# Patient Record
Sex: FEMALE | Race: WHITE | NOT HISPANIC OR LATINO | Employment: UNEMPLOYED | URBAN - METROPOLITAN AREA
[De-identification: names, ages, dates, MRNs, and addresses within clinical notes are randomized per-mention and may not be internally consistent; named-entity substitution may affect disease eponyms.]

---

## 2024-08-21 ENCOUNTER — LAB (OUTPATIENT)
Dept: LAB | Facility: HOSPITAL | Age: 49
End: 2024-08-21
Payer: MEDICAID

## 2024-08-21 ENCOUNTER — TRANSCRIBE ORDERS (OUTPATIENT)
Dept: LAB | Facility: HOSPITAL | Age: 49
End: 2024-08-21
Payer: MEDICAID

## 2024-08-21 DIAGNOSIS — E78.5 HYPERLIPIDEMIA, UNSPECIFIED HYPERLIPIDEMIA TYPE: ICD-10-CM

## 2024-08-21 DIAGNOSIS — D64.9 ANEMIA, UNSPECIFIED TYPE: Primary | ICD-10-CM

## 2024-08-21 DIAGNOSIS — I10 ESSENTIAL HYPERTENSION, MALIGNANT: ICD-10-CM

## 2024-08-21 DIAGNOSIS — E55.9 AVITAMINOSIS D: ICD-10-CM

## 2024-08-21 DIAGNOSIS — E13.69 OTHER SPECIFIED DIABETES MELLITUS WITH OTHER SPECIFIED COMPLICATION, UNSPECIFIED WHETHER LONG TERM INSULIN USE: ICD-10-CM

## 2024-08-21 DIAGNOSIS — D64.9 ANEMIA, UNSPECIFIED TYPE: ICD-10-CM

## 2024-08-21 LAB
ALBUMIN SERPL-MCNC: 3.9 G/DL (ref 3.5–5.2)
ALBUMIN/GLOB SERPL: 1.3 G/DL
ALP SERPL-CCNC: 104 U/L (ref 39–117)
ALT SERPL W P-5'-P-CCNC: 25 U/L (ref 1–33)
ANION GAP SERPL CALCULATED.3IONS-SCNC: 11 MMOL/L (ref 5–15)
AST SERPL-CCNC: 18 U/L (ref 1–32)
BASOPHILS # BLD AUTO: 0.05 10*3/MM3 (ref 0–0.2)
BASOPHILS NFR BLD AUTO: 0.5 % (ref 0–1.5)
BILIRUB SERPL-MCNC: 0.6 MG/DL (ref 0–1.2)
BUN SERPL-MCNC: 11 MG/DL (ref 6–20)
BUN/CREAT SERPL: 14.5 (ref 7–25)
CALCIUM SPEC-SCNC: 9.7 MG/DL (ref 8.6–10.5)
CHLORIDE SERPL-SCNC: 101 MMOL/L (ref 98–107)
CHOLEST SERPL-MCNC: 175 MG/DL (ref 0–200)
CO2 SERPL-SCNC: 28 MMOL/L (ref 22–29)
CREAT SERPL-MCNC: 0.76 MG/DL (ref 0.57–1)
DEPRECATED RDW RBC AUTO: 46.7 FL (ref 37–54)
EGFRCR SERPLBLD CKD-EPI 2021: 95 ML/MIN/1.73
EOSINOPHIL # BLD AUTO: 0.26 10*3/MM3 (ref 0–0.4)
EOSINOPHIL NFR BLD AUTO: 2.4 % (ref 0.3–6.2)
ERYTHROCYTE [DISTWIDTH] IN BLOOD BY AUTOMATED COUNT: 13.4 % (ref 12.3–15.4)
GLOBULIN UR ELPH-MCNC: 3 GM/DL
GLUCOSE SERPL-MCNC: 139 MG/DL (ref 65–99)
HBA1C MFR BLD: 9.6 % (ref 4.8–5.6)
HCT VFR BLD AUTO: 51.4 % (ref 34–46.6)
HDLC SERPL-MCNC: 44 MG/DL (ref 40–60)
HGB BLD-MCNC: 17 G/DL (ref 12–15.9)
IMM GRANULOCYTES # BLD AUTO: 0.02 10*3/MM3 (ref 0–0.05)
IMM GRANULOCYTES NFR BLD AUTO: 0.2 % (ref 0–0.5)
LDLC SERPL CALC-MCNC: 103 MG/DL (ref 0–100)
LDLC/HDLC SERPL: 2.26 {RATIO}
LYMPHOCYTES # BLD AUTO: 3.71 10*3/MM3 (ref 0.7–3.1)
LYMPHOCYTES NFR BLD AUTO: 33.6 % (ref 19.6–45.3)
MCH RBC QN AUTO: 31.1 PG (ref 26.6–33)
MCHC RBC AUTO-ENTMCNC: 33.1 G/DL (ref 31.5–35.7)
MCV RBC AUTO: 94 FL (ref 79–97)
MONOCYTES # BLD AUTO: 0.66 10*3/MM3 (ref 0.1–0.9)
MONOCYTES NFR BLD AUTO: 6 % (ref 5–12)
NEUTROPHILS NFR BLD AUTO: 57.3 % (ref 42.7–76)
NEUTROPHILS NFR BLD AUTO: 6.35 10*3/MM3 (ref 1.7–7)
NRBC BLD AUTO-RTO: 0 /100 WBC (ref 0–0.2)
PLATELET # BLD AUTO: 308 10*3/MM3 (ref 140–450)
PMV BLD AUTO: 11.1 FL (ref 6–12)
POTASSIUM SERPL-SCNC: 4 MMOL/L (ref 3.5–5.2)
PROT SERPL-MCNC: 6.9 G/DL (ref 6–8.5)
RBC # BLD AUTO: 5.47 10*6/MM3 (ref 3.77–5.28)
SODIUM SERPL-SCNC: 140 MMOL/L (ref 136–145)
TRIGL SERPL-MCNC: 158 MG/DL (ref 0–150)
VLDLC SERPL-MCNC: 28 MG/DL (ref 5–40)
WBC NRBC COR # BLD AUTO: 11.05 10*3/MM3 (ref 3.4–10.8)

## 2024-08-21 PROCEDURE — 82306 VITAMIN D 25 HYDROXY: CPT

## 2024-08-21 PROCEDURE — 36415 COLL VENOUS BLD VENIPUNCTURE: CPT

## 2024-08-21 PROCEDURE — 80053 COMPREHEN METABOLIC PANEL: CPT

## 2024-08-21 PROCEDURE — 85025 COMPLETE CBC W/AUTO DIFF WBC: CPT

## 2024-08-21 PROCEDURE — 80061 LIPID PANEL: CPT

## 2024-08-21 PROCEDURE — 83036 HEMOGLOBIN GLYCOSYLATED A1C: CPT

## 2024-08-22 LAB — 25(OH)D3 SERPL-MCNC: 28.8 NG/ML (ref 30–100)

## 2024-08-27 ENCOUNTER — TELEMEDICINE (OUTPATIENT)
Dept: FAMILY MEDICINE CLINIC | Facility: TELEHEALTH | Age: 49
End: 2024-08-27
Payer: MEDICAID

## 2024-08-27 DIAGNOSIS — Z20.818 EXPOSURE TO STREP THROAT: ICD-10-CM

## 2024-08-27 DIAGNOSIS — J02.9 ACUTE PHARYNGITIS, UNSPECIFIED ETIOLOGY: Primary | ICD-10-CM

## 2024-08-27 RX ORDER — CLOPIDOGREL BISULFATE 75 MG
75 TABLET ORAL DAILY
COMMUNITY
Start: 2022-11-01

## 2024-08-27 RX ORDER — BACLOFEN 10 MG/1
1 TABLET ORAL 3 TIMES DAILY
COMMUNITY
Start: 2024-06-12

## 2024-08-27 RX ORDER — FLUOXETINE HYDROCHLORIDE 40 MG/1
CAPSULE ORAL
COMMUNITY

## 2024-08-27 RX ORDER — INSULIN GLARGINE 100 [IU]/ML
INJECTION, SOLUTION SUBCUTANEOUS
COMMUNITY
Start: 2024-08-22

## 2024-08-27 RX ORDER — GABAPENTIN 100 MG/1
CAPSULE ORAL
COMMUNITY
Start: 2024-06-05

## 2024-08-27 RX ORDER — INSULIN GLULISINE 100 [IU]/ML
INJECTION, SOLUTION SUBCUTANEOUS
COMMUNITY

## 2024-08-27 RX ORDER — AMOXICILLIN 500 MG/1
500 CAPSULE ORAL 2 TIMES DAILY
Qty: 20 CAPSULE | Refills: 0 | Status: SHIPPED | OUTPATIENT
Start: 2024-08-27 | End: 2024-09-06

## 2024-08-27 RX ORDER — IPRATROPIUM BROMIDE 17 UG/1
AEROSOL, METERED RESPIRATORY (INHALATION)
COMMUNITY
Start: 2024-06-06

## 2024-08-27 RX ORDER — FLUCONAZOLE 150 MG/1
150 TABLET ORAL
Qty: 2 TABLET | Refills: 0 | Status: SHIPPED | OUTPATIENT
Start: 2024-08-27

## 2024-08-27 RX ORDER — NITROGLYCERIN 0.4 MG/1
TABLET SUBLINGUAL
COMMUNITY
Start: 2024-06-05

## 2024-08-27 RX ORDER — EMPAGLIFLOZIN 25 MG/1
TABLET, FILM COATED ORAL
COMMUNITY

## 2024-08-27 NOTE — PROGRESS NOTES
HPI  Geraldine Pat is a 49 y.o. female  presents with complaint of two week history of sore throat, headaches, and fatigue. Grandchildren have had strep. Unknown if she has had a fever but describes it as her head and ears feel hot at times. Has had strep in the past.     Review of Systems    No past medical history on file.    No family history on file.    Social History     Socioeconomic History    Marital status: Single         There were no vitals taken for this visit.    PHYSICAL EXAM  Physical Exam   Constitutional: She appears well-developed and well-nourished.   HENT:   Head: Normocephalic.   Nose: Nose normal.   Neck: Neck normal appearance.  Pulmonary/Chest: Effort normal.   Neurological: She is alert.   Psychiatric: She has a normal mood and affect. Her speech is normal.       Diagnoses and all orders for this visit:    1. Acute pharyngitis, unspecified etiology (Primary)  -     amoxicillin (AMOXIL) 500 MG capsule; Take 1 capsule by mouth 2 (Two) Times a Day for 10 days.  Dispense: 20 capsule; Refill: 0  -     fluconazole (Diflucan) 150 MG tablet; Take 1 tablet by mouth Every 72 (Seventy-Two) Hours As Needed (yeast).  Dispense: 2 tablet; Refill: 0    2. Exposure to strep throat  -     amoxicillin (AMOXIL) 500 MG capsule; Take 1 capsule by mouth 2 (Two) Times a Day for 10 days.  Dispense: 20 capsule; Refill: 0  -     fluconazole (Diflucan) 150 MG tablet; Take 1 tablet by mouth Every 72 (Seventy-Two) Hours As Needed (yeast).  Dispense: 2 tablet; Refill: 0          FOLLOW-UP  As discussed during visit with [facility], if symptoms worsen or fail to improve, follow-up with PCP/Urgent Care/Emergency Department.    Patient verbalizes understanding of medications, instructions for treatment and follow-up.    Agatha Heredia, APRN  08/27/2024  19:27 EDT    The use of a video visit has been reviewed with the patient and verbal informed consent has been obtained. Myself and Geraldine Pat participated  in this visit. The patient is located in [place], [place], and I am located in [place], [place]. [employer] and [employer] were utilized.

## 2024-10-10 ENCOUNTER — OFFICE VISIT (OUTPATIENT)
Dept: FAMILY MEDICINE CLINIC | Facility: CLINIC | Age: 49
End: 2024-10-10
Payer: MEDICAID

## 2024-10-10 ENCOUNTER — PATIENT ROUNDING (BHMG ONLY) (OUTPATIENT)
Dept: FAMILY MEDICINE CLINIC | Facility: CLINIC | Age: 49
End: 2024-10-10
Payer: MEDICAID

## 2024-10-10 VITALS
TEMPERATURE: 97.8 F | HEIGHT: 67 IN | DIASTOLIC BLOOD PRESSURE: 74 MMHG | BODY MASS INDEX: 42.19 KG/M2 | SYSTOLIC BLOOD PRESSURE: 116 MMHG | OXYGEN SATURATION: 94 % | RESPIRATION RATE: 16 BRPM | HEART RATE: 78 BPM | WEIGHT: 268.8 LBS

## 2024-10-10 DIAGNOSIS — E11.65 TYPE 2 DIABETES MELLITUS WITH HYPERGLYCEMIA, WITH LONG-TERM CURRENT USE OF INSULIN: Primary | ICD-10-CM

## 2024-10-10 DIAGNOSIS — M54.40 CHRONIC LOW BACK PAIN WITH SCIATICA, SCIATICA LATERALITY UNSPECIFIED, UNSPECIFIED BACK PAIN LATERALITY: ICD-10-CM

## 2024-10-10 DIAGNOSIS — Z23 FLU VACCINE NEED: ICD-10-CM

## 2024-10-10 DIAGNOSIS — Z51.81 ENCOUNTER FOR THERAPEUTIC DRUG MONITORING: ICD-10-CM

## 2024-10-10 DIAGNOSIS — Z79.4 TYPE 2 DIABETES MELLITUS WITH HYPERGLYCEMIA, WITH LONG-TERM CURRENT USE OF INSULIN: Primary | ICD-10-CM

## 2024-10-10 DIAGNOSIS — G89.29 CHRONIC LOW BACK PAIN WITH SCIATICA, SCIATICA LATERALITY UNSPECIFIED, UNSPECIFIED BACK PAIN LATERALITY: ICD-10-CM

## 2024-10-10 RX ORDER — BACLOFEN 10 MG/1
10 TABLET ORAL 3 TIMES DAILY
Qty: 60 TABLET | Refills: 3 | Status: SHIPPED | OUTPATIENT
Start: 2024-10-10

## 2024-10-10 RX ORDER — BLOOD-GLUCOSE SENSOR
EACH MISCELLANEOUS
COMMUNITY
Start: 2024-10-02

## 2024-10-10 RX ORDER — ATORVASTATIN CALCIUM 80 MG/1
80 TABLET, FILM COATED ORAL DAILY
COMMUNITY
Start: 2024-09-18

## 2024-10-10 RX ORDER — GABAPENTIN 300 MG/1
300 CAPSULE ORAL 3 TIMES DAILY
COMMUNITY
End: 2024-10-10 | Stop reason: SDUPTHER

## 2024-10-10 RX ORDER — GABAPENTIN 300 MG/1
300 CAPSULE ORAL EVERY 12 HOURS
Qty: 60 CAPSULE | Refills: 3 | Status: SHIPPED | OUTPATIENT
Start: 2024-10-10

## 2024-10-10 RX ORDER — PEN NEEDLE, DIABETIC 32GX 5/32"
NEEDLE, DISPOSABLE MISCELLANEOUS
COMMUNITY
Start: 2024-09-30

## 2024-10-10 RX ORDER — UBIQUINOL 100 MG
CAPSULE ORAL
COMMUNITY
Start: 2024-09-17

## 2024-10-10 NOTE — PROGRESS NOTES
Subjective   Geraldine Pat is a 49 y.o. female  Establish Care (Recently moved to [facility] from [place]. Req flu vaccine.), Back Pain (Req RF for gabapentin, UDS.), and Diabetes      Back Pain  Pertinent negatives include no chest pain, numbness or weakness.   Diabetes  Pertinent negatives for hypoglycemia include no dizziness. Pertinent negatives for diabetes include no chest pain, no fatigue, no polydipsia, no polyphagia, no polyuria and no weakness.     History of Present Illness  The patient is a 48-year-old female who presents for evaluation of multiple medical concerns.    She has been managing type 2 diabetes for over three decades, with her A1c levels typically ranging between 8 and 14, although the lowest recorded was 7.9. Her current treatment regimen includes Lantus and Apidra insulin, along with 25 mg of Jardiance, reduced from an initial 100 mg dosage. She previously used Ozempic, which she found beneficial, but discontinued it due to uncertainty about its availability in her new location. She does not currently have an endocrinologist.    She suffers from spinal stenosis, which causes chronic back pain. Despite having two damaged discs, she is able to stand without falling on most days. However, she experiences weakness, instability, and significant pain. She has consulted with an orthopedic specialist and underwent two MRIs in November 2023. Her pain is generally manageable, but she experiences flare-ups that render her unable to walk or perform daily activities. Due to her diabetes and weight, surgery is not recommended. She takes gabapentin 300 mg twice daily for pain management, which she finds effective. She attempted to reduce the dosage but found it less effective. She also reports shakiness and difficulty rising from a seated position. She has tried physical therapy and is requesting a refill of her baclofen prescription.    She reports no heart issues and has had all relevant tests. She has  seen a cardiologist in the past. She is still taking Prozac.    SOCIAL HISTORY  She smokes.    FAMILY HISTORY  Hyperinsulinism runs in the family.    The following portions of the patient's history were reviewed and updated as appropriate: allergies, current medications, past social history and problem list    Review of Systems   Constitutional:  Negative for appetite change, diaphoresis, fatigue and unexpected weight change.   Eyes:  Negative for visual disturbance.   Respiratory:  Negative for chest tightness and shortness of breath.    Cardiovascular:  Negative for chest pain, palpitations and leg swelling.   Gastrointestinal:  Negative for diarrhea, nausea and vomiting.   Endocrine: Negative for polydipsia, polyphagia and polyuria.   Musculoskeletal:  Positive for back pain.   Skin:  Negative for color change.   Neurological:  Negative for dizziness, weakness, light-headedness and numbness.       Objective     Vitals:    10/10/24 1110   BP: 116/74   Pulse: 78   Resp: 16   Temp: 97.8 °F (36.6 °C)   SpO2: 94%       Physical Exam  Vitals and nursing note reviewed.   Constitutional:       General: She is not in acute distress.     Appearance: Normal appearance. She is well-developed. She is not ill-appearing, toxic-appearing or diaphoretic.   Neck:      Thyroid: No thyromegaly.      Vascular: No JVD.   Cardiovascular:      Rate and Rhythm: Normal rate and regular rhythm.      Pulses: Normal pulses.      Heart sounds: Normal heart sounds. No murmur heard.  Pulmonary:      Effort: Pulmonary effort is normal.      Breath sounds: Normal breath sounds.   Abdominal:      Palpations: Abdomen is soft. There is no mass.      Tenderness: There is no abdominal tenderness.   Musculoskeletal:      Cervical back: Neck supple.   Lymphadenopathy:      Cervical: No cervical adenopathy.   Skin:     General: Skin is warm and dry.   Neurological:      Mental Status: She is alert.      Sensory: No sensory deficit.       Physical  Exam      Assessment & Plan   Assessment & Plan  1. Type 2 Diabetes Mellitus.  He has been managing his diabetes with Lantus, Apidra, and Jardiance 25 mg. He was previously on Ozempic, which helped control his blood sugar, but it was discontinued due to concerns about availability. A referral to endocrinology will be made within the next one to two months to explore further management options, including the possibility of resuming Ozempic or similar medications. Blood work has been ordered, and he will provide a urine sample today to check for microalbumin.    2. Spinal Stenosis.  He experiences chronic back pain due to spinal stenosis and has a history of severe spinal stenosis with a couple of damaged disks. His pain is currently managed with gabapentin 300 mg twice a day, which has been refilled. Baclofen has also been refilled. He has tried physical therapy and is not currently considering surgical options due to his diabetes and weight. The need for a consultation with a pain interventionalist will be considered if his pain becomes unmanageable.    3. Medication Management.  Refills for gabapentin 300 mg twice a day and baclofen have been provided. He continues to take Prozac, Plavix, Lantus, Jardiance 25 mg, and Lipitor 80 mg.      Diagnoses and all orders for this visit:    1. Type 2 diabetes mellitus with hyperglycemia, with long-term current use of insulin (Primary)  -     Comprehensive Metabolic Panel; Future  -     Hemoglobin A1c; Future  -     Lipid Panel; Future    2. Chronic low back pain with sciatica, sciatica laterality unspecified, unspecified back pain laterality  -     gabapentin (NEURONTIN) 300 MG capsule; Take 1 capsule by mouth Every 12 (Twelve) Hours.  Dispense: 60 capsule; Refill: 3  -     baclofen (LIORESAL) 10 MG tablet; Take 1 tablet by mouth 3 times a day.  Dispense: 60 tablet; Refill: 3    3. Encounter for therapeutic drug monitoring  -     Compliance Drug Analysis, Ur - Urine, Clean  Catch; Future    4. Flu vaccine need  -     Fluzone High-Dose 65+yrs (4882-6835)    As part of this patient's treatment plan, patient will be prescribed controlled substances. The patient has been made aware of appropriate use of such medications, including potential risk of somnolence, limited ability to drive and /or work safely, and potential for dependence or overdose. It has also been made clear that these medications are for use by this patient only, without concomitant use of alcohol or other substances unless prescribed.Controlled substance status of medication discussed with patient, discussed risks of medication including abuse potential and diversion potential and need to follow up for reevaluation appointment in order to receive further refills.    Part of this note may be an electronic transcription/translation of spoken language to printed text using the Dragon Dictation System.        I spent 15 minutes in patient care: Reviewing records prior to the visit, examining the patient, entering orders and documentation    Part of this note may be an electronic transcription/translation of spoken language to printed text using the Dragon Dictation System.       Patient or patient representative verbalized consent for the use of Ambient Listening during the visit with  KARRI Patrick for chart documentation. 10/10/2024  11:47 EDT

## 2024-10-10 NOTE — PROGRESS NOTES
[facility] a My-Chart message has been sent to the patient for PATIENT ROUNDING with a My chart message

## 2024-10-11 ENCOUNTER — PREP FOR SURGERY (OUTPATIENT)
Dept: OTHER | Facility: HOSPITAL | Age: 49
End: 2024-10-11
Payer: MEDICAID

## 2024-10-11 DIAGNOSIS — H25.11 NUCLEAR SCLEROTIC CATARACT OF RIGHT EYE: Primary | ICD-10-CM

## 2024-10-11 RX ORDER — TETRACAINE HYDROCHLORIDE 5 MG/ML
1 SOLUTION OPHTHALMIC SEE ADMIN INSTRUCTIONS
OUTPATIENT
Start: 2024-10-11

## 2024-10-11 RX ORDER — SODIUM CHLORIDE 0.9 % (FLUSH) 0.9 %
10 SYRINGE (ML) INJECTION EVERY 12 HOURS SCHEDULED
OUTPATIENT
Start: 2024-10-11

## 2024-10-11 RX ORDER — PREDNISOLONE ACETATE 10 MG/ML
1 SUSPENSION/ DROPS OPHTHALMIC SEE ADMIN INSTRUCTIONS
OUTPATIENT
Start: 2024-10-11

## 2024-10-11 RX ORDER — CYCLOPENT/TROPIC/PHEN/KETR/WAT 1%-1%-2.5%
1 DROPS (EA) OPHTHALMIC (EYE)
OUTPATIENT
Start: 2024-10-11 | End: 2024-10-11

## 2024-10-11 RX ORDER — SODIUM CHLORIDE 0.9 % (FLUSH) 0.9 %
1-10 SYRINGE (ML) INJECTION AS NEEDED
OUTPATIENT
Start: 2024-10-11

## 2024-10-21 LAB — DRUGS UR: NORMAL

## 2024-10-22 NOTE — PRE-PROCEDURE INSTRUCTIONS
PAT phone history completed with patient for upcoming procedure on 10/24/24 with Dr. Moran.    PAT PASS reviewed with patient and they verbalize understanding of the following:     Do not eat or drink anything after midnight the night before procedure unless otherwise instructed by physician/surgeon's office, this includes no gum, candy, mints, tobacco products or e-cigarettes.  Do not shave the area to be operated on at least 48 hours prior to procedure.  Do not wear makeup, lotion, hair products, or nail polish.  Do not wear any jewelry and remove all piercings.  Do not wear any adhesive if you wear dentures.  Do not wear contacts; bring in glasses if needed.  Only take medications on the morning of procedure as instructed by PAT nurse per anesthesia guidelines or as instructed by physician's office.  If you are on any blood thinners reach out to the physician/surgeon's office for instructions on when/if they will need to be stopped prior to procedure.  Bring in picture ID and insurance card, advanced directive copies if applicable, CPAP/BIPAP/Inhalers if indicated morning of procedure, leave any other valuables at home.  Ensure you have arranged for someone to drive you home the day of your procedure and someone to care for you at home afterwards. It is recommended that you do not drive, drink alcohol, or make any major legal decisions for at least 24 hours after your procedure is complete.    Instructions given on hospital entrance and registration location.

## 2024-10-24 ENCOUNTER — ANESTHESIA (OUTPATIENT)
Dept: PERIOP | Facility: HOSPITAL | Age: 49
End: 2024-10-24
Payer: MEDICAID

## 2024-10-24 ENCOUNTER — HOSPITAL ENCOUNTER (OUTPATIENT)
Facility: HOSPITAL | Age: 49
Setting detail: HOSPITAL OUTPATIENT SURGERY
Discharge: HOME | End: 2024-10-24
Attending: OPHTHALMOLOGY | Admitting: OPHTHALMOLOGY
Payer: MEDICAID

## 2024-10-24 ENCOUNTER — ANESTHESIA EVENT (OUTPATIENT)
Dept: PERIOP | Facility: HOSPITAL | Age: 49
End: 2024-10-24
Payer: MEDICAID

## 2024-10-24 VITALS
RESPIRATION RATE: 16 BRPM | TEMPERATURE: 98 F | OXYGEN SATURATION: 94 % | SYSTOLIC BLOOD PRESSURE: 96 MMHG | DIASTOLIC BLOOD PRESSURE: 61 MMHG | HEART RATE: 85 BPM

## 2024-10-24 DIAGNOSIS — H25.11 NUCLEAR SCLEROTIC CATARACT OF RIGHT EYE: ICD-10-CM

## 2024-10-24 LAB — GLUCOSE BLDC GLUCOMTR-MCNC: 134 MG/DL (ref 70–130)

## 2024-10-24 PROCEDURE — 25010000002 LIDOCAINE HCL (PF) 4 % SOLUTION: Performed by: OPHTHALMOLOGY

## 2024-10-24 PROCEDURE — 25010000002 MIDAZOLAM PER 1MG: Performed by: NURSE ANESTHETIST, CERTIFIED REGISTERED

## 2024-10-24 PROCEDURE — 25010000002 FENTANYL CITRATE (PF) 50 MCG/ML SOLUTION: Performed by: NURSE ANESTHETIST, CERTIFIED REGISTERED

## 2024-10-24 PROCEDURE — V2632 POST CHMBR INTRAOCULAR LENS: HCPCS | Performed by: OPHTHALMOLOGY

## 2024-10-24 PROCEDURE — 82948 REAGENT STRIP/BLOOD GLUCOSE: CPT | Performed by: OPHTHALMOLOGY

## 2024-10-24 DEVICE — IMPLANTABLE DEVICE: Type: IMPLANTABLE DEVICE | Site: POSTERIOR CHAMBER | Status: FUNCTIONAL

## 2024-10-24 RX ORDER — PREDNISOLONE ACETATE 10 MG/ML
1 SUSPENSION/ DROPS OPHTHALMIC 4 TIMES DAILY
Start: 2024-10-24

## 2024-10-24 RX ORDER — LIDOCAINE HYDROCHLORIDE 40 MG/ML
INJECTION, SOLUTION RETROBULBAR AS NEEDED
Status: DISCONTINUED | OUTPATIENT
Start: 2024-10-24 | End: 2024-10-24 | Stop reason: HOSPADM

## 2024-10-24 RX ORDER — KETAMINE HCL IN NACL, ISO-OSM 100MG/10ML
SYRINGE (ML) INJECTION AS NEEDED
Status: DISCONTINUED | OUTPATIENT
Start: 2024-10-24 | End: 2024-10-24 | Stop reason: SURG

## 2024-10-24 RX ORDER — BALANCED SALT SOLUTION 6.4; .75; .48; .3; 3.9; 1.7 MG/ML; MG/ML; MG/ML; MG/ML; MG/ML; MG/ML
SOLUTION OPHTHALMIC AS NEEDED
Status: DISCONTINUED | OUTPATIENT
Start: 2024-10-24 | End: 2024-10-24 | Stop reason: HOSPADM

## 2024-10-24 RX ORDER — POVIDONE-IODINE 5 %
SOLUTION, NON-ORAL OPHTHALMIC (EYE) AS NEEDED
Status: DISCONTINUED | OUTPATIENT
Start: 2024-10-24 | End: 2024-10-24 | Stop reason: HOSPADM

## 2024-10-24 RX ORDER — NEOMYCIN SULFATE, POLYMYXIN B SULFATE, AND DEXAMETHASONE 3.5; 10000; 1 MG/G; [USP'U]/G; MG/G
OINTMENT OPHTHALMIC AS NEEDED
Status: DISCONTINUED | OUTPATIENT
Start: 2024-10-24 | End: 2024-10-24 | Stop reason: HOSPADM

## 2024-10-24 RX ORDER — TETRACAINE HYDROCHLORIDE 5 MG/ML
SOLUTION OPHTHALMIC AS NEEDED
Status: DISCONTINUED | OUTPATIENT
Start: 2024-10-24 | End: 2024-10-24 | Stop reason: HOSPADM

## 2024-10-24 RX ORDER — TETRACAINE HYDROCHLORIDE 5 MG/ML
1 SOLUTION OPHTHALMIC SEE ADMIN INSTRUCTIONS
Status: COMPLETED | OUTPATIENT
Start: 2024-10-24 | End: 2024-10-24

## 2024-10-24 RX ORDER — SODIUM CHLORIDE 0.9 % (FLUSH) 0.9 %
1-10 SYRINGE (ML) INJECTION AS NEEDED
Status: DISCONTINUED | OUTPATIENT
Start: 2024-10-24 | End: 2024-10-24 | Stop reason: HOSPADM

## 2024-10-24 RX ORDER — CYCLOPENT/TROPIC/PHEN/KETR/WAT 1%-1%-2.5%
1 DROPS (EA) OPHTHALMIC (EYE)
Status: COMPLETED | OUTPATIENT
Start: 2024-10-24 | End: 2024-10-24

## 2024-10-24 RX ORDER — FENTANYL CITRATE 50 UG/ML
INJECTION, SOLUTION INTRAMUSCULAR; INTRAVENOUS AS NEEDED
Status: DISCONTINUED | OUTPATIENT
Start: 2024-10-24 | End: 2024-10-24 | Stop reason: SURG

## 2024-10-24 RX ORDER — MIDAZOLAM HYDROCHLORIDE 2 MG/2ML
INJECTION, SOLUTION INTRAMUSCULAR; INTRAVENOUS AS NEEDED
Status: DISCONTINUED | OUTPATIENT
Start: 2024-10-24 | End: 2024-10-24 | Stop reason: SURG

## 2024-10-24 RX ORDER — PREDNISOLONE ACETATE 10 MG/ML
1 SUSPENSION/ DROPS OPHTHALMIC SEE ADMIN INSTRUCTIONS
Status: DISCONTINUED | OUTPATIENT
Start: 2024-10-24 | End: 2024-10-24 | Stop reason: HOSPADM

## 2024-10-24 RX ORDER — ACETAZOLAMIDE 500 MG/1
500 CAPSULE, EXTENDED RELEASE ORAL ONCE
Status: COMPLETED | OUTPATIENT
Start: 2024-10-24 | End: 2024-10-24

## 2024-10-24 RX ORDER — PREDNISOLONE ACETATE 10 MG/ML
SUSPENSION/ DROPS OPHTHALMIC AS NEEDED
Status: DISCONTINUED | OUTPATIENT
Start: 2024-10-24 | End: 2024-10-24 | Stop reason: HOSPADM

## 2024-10-24 RX ORDER — SODIUM CHLORIDE 0.9 % (FLUSH) 0.9 %
10 SYRINGE (ML) INJECTION EVERY 12 HOURS SCHEDULED
Status: DISCONTINUED | OUTPATIENT
Start: 2024-10-24 | End: 2024-10-24 | Stop reason: HOSPADM

## 2024-10-24 RX ADMIN — ACETAZOLAMIDE 500 MG: 500 CAPSULE ORAL at 09:49

## 2024-10-24 RX ADMIN — FENTANYL CITRATE 50 MCG: 50 INJECTION, SOLUTION INTRAMUSCULAR; INTRAVENOUS at 09:30

## 2024-10-24 RX ADMIN — Medication 20 MG: at 09:33

## 2024-10-24 RX ADMIN — TETRACAINE HYDROCHLORIDE 1 DROP: 5 SOLUTION OPHTHALMIC at 08:01

## 2024-10-24 RX ADMIN — TETRACAINE HYDROCHLORIDE 1 DROP: 5 SOLUTION OPHTHALMIC at 08:02

## 2024-10-24 RX ADMIN — Medication 1 DROP: at 08:08

## 2024-10-24 RX ADMIN — Medication 1 DROP: at 08:13

## 2024-10-24 RX ADMIN — Medication 1 DROP: at 08:03

## 2024-10-24 RX ADMIN — MIDAZOLAM HYDROCHLORIDE 2 MG: 1 INJECTION, SOLUTION INTRAMUSCULAR; INTRAVENOUS at 09:30

## 2024-10-24 NOTE — ANESTHESIA PREPROCEDURE EVALUATION
Anesthesia Evaluation     Patient summary reviewed and Nursing notes reviewed   NPO Solid Status: > 8 hours  NPO Liquid Status: > 8 hours           Airway   Mallampati: II  TM distance: >3 FB  Neck ROM: full  Possible difficult intubation  Dental      Pulmonary    (+) COPD, asthma,  Cardiovascular     (+) PVD, DVT, hyperlipidemia      Neuro/Psych  (+) psychiatric history  GI/Hepatic/Renal/Endo    (+) obesity, morbid obesity, diabetes mellitus    Musculoskeletal     Abdominal    Substance History      OB/GYN          Other   arthritis,                 Anesthesia Plan    ASA 3     MAC     intravenous induction     Anesthetic plan, risks, benefits, and alternatives have been provided, discussed and informed consent has been obtained with: patient.  Pre-procedure education provided  Plan discussed with CRNA.    CODE STATUS:

## 2024-10-24 NOTE — ANESTHESIA POSTPROCEDURE EVALUATION
Patient: Geraldine Pat    Procedure Summary       Date: 10/24/24 Room / Location: [facility] OR 3 /  [facility]    Anesthesia Start: 0928 Anesthesia Stop: 0938    Procedure: CATARACT PHACO EXTRACTION WITH INTRAOCULAR LENS IMPLANT RIGHT (Right: Eye) Diagnosis:       Nuclear sclerotic cataract of right eye      (Nuclear sclerotic cataract of right eye [H25.11])    Surgeons: Suresh Mroan MD Provider: Kunal Frost CRNA    Anesthesia Type: MAC ASA Status: 3            Anesthesia Type: MAC    Vitals  No vitals data found for the desired time range.          Post Anesthesia Care and Evaluation    Patient location during evaluation: bedside  Patient participation: complete - patient participated  Level of consciousness: awake  Pain score: 0  Pain management: adequate    Airway patency: patent  Anesthetic complications: No anesthetic complications  PONV Status: controlled  Cardiovascular status: acceptable and stable  Respiratory status: acceptable and room air  Hydration status: acceptable    Comments: See nursing documentation for post op vital signs

## 2024-10-24 NOTE — DISCHARGE INSTRUCTIONS
Formerly [facility]  [address] [place] [address]  (P): [phone]           (F): [phone]    Geraldine Pat  PATIENT NAME:__________________________________    Right Eye    POST OPERATIVE INSTRUCTIONS    You have received anesthesia today. DO NOT drive, drink alcohol, sign legal documents.   After surgery, your eye will not hurt. It may feel scratchy, sticky or uncomfortable. Your eye will be sensitive to light.  Most people see better 1-3 days after the procedure, but it could take 3 weeks to get the full benefits and reach your visual potential. If your vision is blurry for a few days it is normal, and means you may have swelling outside or inside the eye. For some patients, a bubble is placed and there will be blurriness.   You should receive a post-op kit with tape and an eye shield. Wear the shield for the first 3 nights after surgery to keep you from rubbing the eye.  Most people are able to return to their normal routine 1-3 days after surgery, however, due to the brain adjusting to your new vision you may have trouble judging distances and want to be careful when driving and going up and downstairs.   You can shower and wash your hair the day after surgery. Keep water, shampoo, hair spray and shaving lotion out of the eye, especially for the first week.  During the first week, you should AVOID:   Rubbing or putting pressure on your eye.  Eye make-up, face cream or lotion, hair coloring or perms  Strenuous activities, such as running or lifting weights, as to avoid sweat from getting in the eye. Avoid swimming, hot tubs, fumes or michel conditions.   Keep your head above your heart (no hanging the head down for periods of time).  Some discomfort and blurred vision after surgery are normal, but if you have any unusual pain, swelling, bleeding or sudden decrease in vision, contact our office immediately. Emergency assistance is available at any time by calling:    Dr. Suresh Loo  SAMANTHA Moran    [phone]    If unable to reach call [facility] @ [phone]      POST OPERATIVE DROP INSTRUCTIONS  You have been prescribed eye drops to use after surgery, please follow these instructions:  PLACE A PARDEEP IN THE DAY COLUMN EACH TIME YOU USE TO KEEP ON SCHEDULE. WAIT 5 MINUTES IN BETWEEN DROPS    Prednisolone (PINK TOP) SHAKE WELL BEFORE USE   GIVEN TO YOU BY THE HOSPITAL    WEEK 1-USE 4  (FOUR) TIMES A DAY DAY 1   DAY 2 DAY 3 DAY 4 DAY 5 DAY 6 DAY 7     WEEK 2-USE 3 (THREE)  TIMES A DAY DAY 1 DAY 2 DAY 3 DAY 4 DAY 5 DAY 6 DAY 7     WEEK 3-USE 2 (TWO) TIMES A DAY DAY 1 DAY 2 DAY 3 DAY 4 DAY 5 DAY 6 DAY 7     WEEK 4-USE 1 (ONE)TIME A DAY DAY 1 DAY 2 DAY 3 DAY 4 DAY 5 DAY 6 DAY 7         Ketorolac (GRAY TOP) PRESCRIBED TO YOUR PHARMACY    WEEK 1-USE 4  (FOUR) TIMES A DAY DAY 1   DAY 2 DAY 3 DAY 4 DAY 5 DAY 6 DAY 7     WEEK 2-USE 3 (THREE)  TIMES A DAY DAY 1 DAY 2 DAY 3 DAY 4 DAY 5 DAY 6 DAY 7     WEEK 3-USE 2 (TWO) TIMES A DAY DAY 1 DAY 2 DAY 3 DAY 4 DAY 5 DAY 6 DAY 7     WEEK 4-USE 1 (ONE)TIME A DAY DAY 1 DAY 2 DAY 3 DAY 4 DAY 5 DAY 6 DAY 7       Moxifloxacin (TAN TOP) PRESCRIBED TO YOUR PHARMACY    WEEK 1-USE 4  (FOUR) TIMES A DAY DAY 1   DAY 2 DAY 3 DAY 4 DAY 5 DAY 6 DAY 7     No pushing, pulling, tugging,  heavy lifting, or strenuous activity.  No major decision making, driving, or drinking alcoholic beverages for 24 hours. ( due to the medications you have  received)  Always use good hand hygiene/washing techniques.  NO driving while taking pain medications.    * if you have an incision:  Check your incision area every day for signs of infection.   Check for:  * more redness, swelling, or pain  *more fluid or blood  *warmth  *pus or bad smellTo assist you in voiding:  Drink plenty of fluids  Listen to running water while attempting to void.    If you are unable to urinate and you have an uncomfortable urge to void or it has been   6  hours since you were discharged, return to the Emergency Room

## 2024-10-24 NOTE — OP NOTE
OPERATIVE NOTE    Date of Procedure: 10/24/2024  Patient Name: Geraldine Pat  Patient MRN: [id number]  YOB: 1975     Preoperative Diagnosis: Right nuclear sclerotic cataract.     Postoperative Diagnosis: Right nuclear sclerotic cataract.     Procedure Performed: Phacoemulsification with implantation of a foldable posterior chamber intraocular lens, Right eye.     Surgeon: Suresh Moran MD    Anesthesia:  Monitored Anesthesia Care (MAC)      Brief History and Indication: The patient presents with a history of past progressive loss of vision.  Patient was diagnosed with cataract and requests removal for increased ability to read and see.     Operation Description: The patient was taken to the OR and prepped and draped in the usual sterile ophthalmic fashion. A lid speculum was placed in the eye.  A #75 blade was then used to make a stab incision two o’clock hours from the intended temporal clear cornea groove. The anterior chamber was then inflated with a Viscoelastic. A metal microkeratome blade was then used to enter the anterior chamber at the temporal clear cornea site. A three level tunnel incision was made. A curvilinear capsulorrhexis was then performed with a bent cystotome needle and capsulorrhexis forceps.  BSS on a 30 gauge bent cannula was used to hydro-dissect, and hydro-delineate the lens. Good fluid waves and hydro-delineation were noted. Phacoemulsification was then used to remove nuclear material without complications. The residual cortical and lenticular material was then removed with irrigation and aspiration. Viscoelastics were then used to inflate the bag in a soft shell technique. A PCIOL was injected into the bag. Post-implantation, there were no rents or tears in the bag and the lens was noted to be stable and centered. The residual Viscoelastic was then removed with irrigation and aspiration.  The wound was checked and found to be without leaks. Therefore a Polydex ointment and  one drop of Durezol eye drop was placed in the eye.     Implant Information:   Implant Name Type Inv. Item Serial No.  Lot No. LRB No. Used Action   LENS MONOFOCAL IQ [id number] - [id number] - [id number] Implant LENS MONOFOCAL IQ [id number]  Right 1 Implanted       Complications: None    Estimated Blood Loss:  Less than 1 cc.       []   Changed to complex procedure due to: []  hypermature, white cataract. Blue dye was used. []   small iris. A Malyugin Ring was used.    Discharge and Condition  The patient was transported to same day surgery in excellent condition and scheduled for follow-up tomorrow morning. The patient was given instructions on use of eye drops for the operative eye and was specifically instructed to call Dr. Moran at his office or home for any nausea, vomiting, headache, decreased visual acuity, or pain, or if the patient had any general concerns.    Suresh Moran MD  10/24/2024

## 2024-10-24 NOTE — H&P
[facility]         History and Physical    Patient Name: Geraldine Pat  MRN: [id number]  : 1975  Gender: female     HPI: Patient complaint of PPLOV Right eye diagnosed with cataract. Patient requests PHACO PCIOL for Increase of VA/ADL.    History:    Past Medical History:   Diagnosis Date    Arthritis     Asthma     Cataract     Colon polyp     COPD (chronic obstructive pulmonary disease)     Deep vein thrombosis     Depression     Diabetes mellitus     Hyperlipidemia     Low back pain     Obesity     PVD (peripheral vascular disease)     Substance abuse     Visual impairment        Past Surgical History:   Procedure Laterality Date    ABSCESS DRAINAGE      ANGIOPLASTY / STENTING FEMORAL Right     CARDIAC CATHETERIZATION  2024    no stents needed     SECTION      COLONOSCOPY      SKIN GRAFT      TONSILLECTOMY         Social History     Socioeconomic History    Marital status:    Tobacco Use    Smoking status: Every Day     Current packs/day: 1.00     Average packs/day: 1 pack/day for 40.0 years (40.0 ttl pk-yrs)     Types: Cigarettes    Smokeless tobacco: Never   Vaping Use    Vaping status: Never Used   Substance and Sexual Activity    Alcohol use: Not Currently     Comment: Quit 16    Drug use: Not Currently     Types: Amphetamines, Methamphetamines    Sexual activity: Not Currently     Partners: Female, Male     Comment: Non att       Family History   Problem Relation Age of Onset    Alcohol abuse Mother     Depression Mother     Diabetes Mother     Drug abuse Mother     Hearing loss Mother     Hyperlipidemia Mother     Learning disabilities Mother     Mental illness Mother     Developmental Disability Father     Diabetes Father     Drug abuse Father     Heart disease Father     Hyperlipidemia Father     Mental illness Father     Obesity Father     Diabetes Sister     Cancer Sister     Drug abuse Sister        Prior to Admission  Medications:  Medications Prior to Admission   Medication Sig Dispense Refill Last Dose/Taking    ASPIRIN 81 PO Daily.   10/24/2024 at  6:00 AM    atorvastatin (LIPITOR) 80 MG tablet Take 1 tablet by mouth Daily.   10/23/2024 Evening    Atrovent HFA 17 MCG/ACT inhaler As Needed.   10/23/2024 Morning    baclofen (LIORESAL) 10 MG tablet Take 1 tablet by mouth 3 times a day. 60 tablet 3 10/23/2024 Evening    BD Pen Needle Desiree 2nd Gen 32G X 4 MM misc    Past Month    Continuous Glucose Sensor (FreeStyle Hunter 3 Sensor) misc    Past Month    gabapentin (NEURONTIN) 300 MG capsule Take 1 capsule by mouth Every 12 (Twelve) Hours. 60 capsule 3 10/23/2024 Evening    insulin glulisine (Apidra) 100 UNIT/ML injection Inject  under the skin into the appropriate area as directed 3 (Three) Times a Day Before Meals. Sliding scale up to 30 units   10/23/2024 Morning    Jardiance 25 MG tablet tablet Take 1 tablet by mouth Daily.   10/23/2024 Morning    Lantus SoloStar 100 UNIT/ML injection pen Inject 50 Units under the skin into the appropriate area as directed 2 (Two) Times a Day.   10/23/2024 Morning    Plavix 75 MG tablet Take 1 tablet by mouth Daily.   10/24/2024 at  6:00 AM    PROzac 40 MG capsule Take 1 capsule by mouth Daily.   10/23/2024 Morning    Alcohol Swabs (Alcohol Prep) 70 % pads    Unknown    nitroglycerin (NITROSTAT) 0.4 MG SL tablet take 1 tablet by mouth every 5 minutes if needed chest pain (MAX ...  (REFER TO PRESCRIPTION NOTES). (Patient not taking: Reported on 10/22/2024)   Unknown       Allergies:  Allergies   Allergen Reactions    Naproxen Rash     Can take motrin    Quetiapine Delirium and Confusion    Salsalate Rash    Vancomycin Rash        Vitals: Temp:  [97.2 °F (36.2 °C)] 97.2 °F (36.2 °C)  Heart Rate:  [77] 77  Resp:  [18] 18  BP: (183)/(81) 183/81    Review of Systems:   Within Normal Limits Abnormal   HEENT [x]    []     Cardiovascular [x]   []     Gastrointestinal [x]   []     Genitourinary [x]   []      Neurologic [x]   []     Pulmonary [x]   []       Physical Exam:   Within Normal Limits Abnormal   HEENT [x]    []     Heart [x]   []     Lungs [x]   []     Abdomen [x]   []     Extremities [x]   []       Impression: Right nuclear sclerotic cataract.     Plan: CATARACT PHACO EXTRACTION WITH INTRAOCULAR LENS IMPLANT RIGHT (Right)     Suresh Moran MD  10/24/2024

## 2024-12-05 ENCOUNTER — PREP FOR SURGERY (OUTPATIENT)
Dept: OTHER | Facility: HOSPITAL | Age: 49
End: 2024-12-05
Payer: MEDICAID

## 2024-12-05 DIAGNOSIS — H25.12 NUCLEAR SCLEROTIC CATARACT OF LEFT EYE: Primary | ICD-10-CM

## 2024-12-05 RX ORDER — PREDNISOLONE ACETATE 10 MG/ML
1 SUSPENSION/ DROPS OPHTHALMIC SEE ADMIN INSTRUCTIONS
Status: CANCELLED | OUTPATIENT
Start: 2024-12-05

## 2024-12-05 RX ORDER — SODIUM CHLORIDE 0.9 % (FLUSH) 0.9 %
1-10 SYRINGE (ML) INJECTION AS NEEDED
Status: CANCELLED | OUTPATIENT
Start: 2024-12-05

## 2024-12-05 RX ORDER — SODIUM CHLORIDE 0.9 % (FLUSH) 0.9 %
10 SYRINGE (ML) INJECTION EVERY 12 HOURS SCHEDULED
Status: CANCELLED | OUTPATIENT
Start: 2024-12-05

## 2024-12-05 RX ORDER — CYCLOPENT/TROPIC/PHEN/KETR/WAT 1%-1%-2.5%
1 DROPS (EA) OPHTHALMIC (EYE)
Status: CANCELLED | OUTPATIENT
Start: 2024-12-05 | End: 2024-12-05

## 2024-12-05 RX ORDER — TETRACAINE HYDROCHLORIDE 5 MG/ML
1 SOLUTION OPHTHALMIC SEE ADMIN INSTRUCTIONS
Status: CANCELLED | OUTPATIENT
Start: 2024-12-05

## 2024-12-13 NOTE — PRE-PROCEDURE INSTRUCTIONS
PAT phone history completed with patient for upcoming procedure on 12/16/24 with Dr. Moran.    PAT PASS reviewed with patient and they verbalize understanding of the following:     Do not eat or drink anything after midnight the night before procedure unless otherwise instructed by physician/surgeon's office, this includes no gum, candy, mints, tobacco products or e-cigarettes.  Do not shave the area to be operated on at least 48 hours prior to procedure.  Do not wear makeup, lotion, hair products, or nail polish.  Do not wear any jewelry and remove all piercings.  Do not wear any adhesive if you wear dentures.  Do not wear contacts; bring in glasses if needed.  Only take medications on the morning of procedure as instructed by PAT nurse per anesthesia guidelines or as instructed by physician's office.  If you are on any blood thinners reach out to the physician/surgeon's office for instructions on when/if they will need to be stopped prior to procedure.  Bring in picture ID and insurance card, advanced directive copies if applicable, CPAP/BIPAP/Inhalers if indicated morning of procedure, leave any other valuables at home.  Ensure you have arranged for someone to drive you home the day of your procedure and someone to care for you at home afterwards. It is recommended that you do not drive, drink alcohol, or make any major legal decisions for at least 24 hours after your procedure is complete.  ERAS instructions given unless otherwise instructed per surgeon's orders.    Instructions given on hospital entrance and registration location.

## 2024-12-16 ENCOUNTER — ANESTHESIA EVENT (OUTPATIENT)
Dept: PERIOP | Facility: HOSPITAL | Age: 49
End: 2024-12-16
Payer: MEDICAID

## 2024-12-16 ENCOUNTER — ANESTHESIA (OUTPATIENT)
Dept: PERIOP | Facility: HOSPITAL | Age: 49
End: 2024-12-16
Payer: MEDICAID

## 2024-12-16 ENCOUNTER — HOSPITAL ENCOUNTER (OUTPATIENT)
Facility: HOSPITAL | Age: 49
Setting detail: HOSPITAL OUTPATIENT SURGERY
Discharge: HOME | End: 2024-12-16
Attending: OPHTHALMOLOGY | Admitting: OPHTHALMOLOGY
Payer: MEDICAID

## 2024-12-16 VITALS
HEIGHT: 67 IN | TEMPERATURE: 97.3 F | WEIGHT: 260 LBS | OXYGEN SATURATION: 97 % | BODY MASS INDEX: 40.81 KG/M2 | DIASTOLIC BLOOD PRESSURE: 70 MMHG | HEART RATE: 73 BPM | SYSTOLIC BLOOD PRESSURE: 124 MMHG | RESPIRATION RATE: 16 BRPM

## 2024-12-16 DIAGNOSIS — H25.12 NUCLEAR SCLEROTIC CATARACT OF LEFT EYE: ICD-10-CM

## 2024-12-16 LAB — GLUCOSE BLDC GLUCOMTR-MCNC: 142 MG/DL (ref 70–130)

## 2024-12-16 PROCEDURE — 25010000002 MIDAZOLAM PER 1MG: Performed by: NURSE ANESTHETIST, CERTIFIED REGISTERED

## 2024-12-16 PROCEDURE — 82948 REAGENT STRIP/BLOOD GLUCOSE: CPT | Performed by: OPHTHALMOLOGY

## 2024-12-16 PROCEDURE — V2632 POST CHMBR INTRAOCULAR LENS: HCPCS | Performed by: OPHTHALMOLOGY

## 2024-12-16 PROCEDURE — 25010000002 FENTANYL CITRATE (PF) 50 MCG/ML SOLUTION PREFILLED SYRINGE: Performed by: NURSE ANESTHETIST, CERTIFIED REGISTERED

## 2024-12-16 PROCEDURE — 25010000002 LIDOCAINE HCL (PF) 4 % SOLUTION: Performed by: OPHTHALMOLOGY

## 2024-12-16 DEVICE — IMPLANTABLE DEVICE: Type: IMPLANTABLE DEVICE | Site: POSTERIOR CHAMBER | Status: FUNCTIONAL

## 2024-12-16 RX ORDER — SODIUM CHLORIDE 0.9 % (FLUSH) 0.9 %
10 SYRINGE (ML) INJECTION EVERY 12 HOURS SCHEDULED
Status: DISCONTINUED | OUTPATIENT
Start: 2024-12-16 | End: 2024-12-16 | Stop reason: HOSPADM

## 2024-12-16 RX ORDER — MIDAZOLAM HYDROCHLORIDE 2 MG/2ML
INJECTION, SOLUTION INTRAMUSCULAR; INTRAVENOUS AS NEEDED
Status: DISCONTINUED | OUTPATIENT
Start: 2024-12-16 | End: 2024-12-16 | Stop reason: SURG

## 2024-12-16 RX ORDER — PREDNISOLONE ACETATE 10 MG/ML
SUSPENSION/ DROPS OPHTHALMIC AS NEEDED
Status: DISCONTINUED | OUTPATIENT
Start: 2024-12-16 | End: 2024-12-16 | Stop reason: HOSPADM

## 2024-12-16 RX ORDER — BALANCED SALT SOLUTION 6.4; .75; .48; .3; 3.9; 1.7 MG/ML; MG/ML; MG/ML; MG/ML; MG/ML; MG/ML
SOLUTION OPHTHALMIC AS NEEDED
Status: DISCONTINUED | OUTPATIENT
Start: 2024-12-16 | End: 2024-12-16 | Stop reason: HOSPADM

## 2024-12-16 RX ORDER — NEOMYCIN SULFATE, POLYMYXIN B SULFATE, AND DEXAMETHASONE 3.5; 10000; 1 MG/G; [USP'U]/G; MG/G
OINTMENT OPHTHALMIC AS NEEDED
Status: DISCONTINUED | OUTPATIENT
Start: 2024-12-16 | End: 2024-12-16 | Stop reason: HOSPADM

## 2024-12-16 RX ORDER — SODIUM CHLORIDE 0.9 % (FLUSH) 0.9 %
1-10 SYRINGE (ML) INJECTION AS NEEDED
Status: DISCONTINUED | OUTPATIENT
Start: 2024-12-16 | End: 2024-12-16 | Stop reason: HOSPADM

## 2024-12-16 RX ORDER — TETRACAINE HYDROCHLORIDE 5 MG/ML
1 SOLUTION OPHTHALMIC SEE ADMIN INSTRUCTIONS
Status: COMPLETED | OUTPATIENT
Start: 2024-12-16 | End: 2024-12-16

## 2024-12-16 RX ORDER — CYCLOPENT/TROPIC/PHEN/KETR/WAT 1%-1%-2.5%
1 DROPS (EA) OPHTHALMIC (EYE)
Status: COMPLETED | OUTPATIENT
Start: 2024-12-16 | End: 2024-12-16

## 2024-12-16 RX ORDER — PREDNISOLONE ACETATE 10 MG/ML
1 SUSPENSION/ DROPS OPHTHALMIC SEE ADMIN INSTRUCTIONS
Status: DISCONTINUED | OUTPATIENT
Start: 2024-12-16 | End: 2024-12-16 | Stop reason: HOSPADM

## 2024-12-16 RX ORDER — LIDOCAINE HYDROCHLORIDE 40 MG/ML
INJECTION, SOLUTION RETROBULBAR AS NEEDED
Status: DISCONTINUED | OUTPATIENT
Start: 2024-12-16 | End: 2024-12-16 | Stop reason: HOSPADM

## 2024-12-16 RX ORDER — POVIDONE-IODINE 5 %
SOLUTION, NON-ORAL OPHTHALMIC (EYE) AS NEEDED
Status: DISCONTINUED | OUTPATIENT
Start: 2024-12-16 | End: 2024-12-16 | Stop reason: HOSPADM

## 2024-12-16 RX ORDER — TETRACAINE HYDROCHLORIDE 5 MG/ML
SOLUTION OPHTHALMIC AS NEEDED
Status: DISCONTINUED | OUTPATIENT
Start: 2024-12-16 | End: 2024-12-16 | Stop reason: HOSPADM

## 2024-12-16 RX ORDER — FENTANYL CITRATE 50 UG/ML
INJECTION, SOLUTION INTRAMUSCULAR; INTRAVENOUS AS NEEDED
Status: DISCONTINUED | OUTPATIENT
Start: 2024-12-16 | End: 2024-12-16 | Stop reason: SURG

## 2024-12-16 RX ORDER — IPRATROPIUM BROMIDE AND ALBUTEROL SULFATE 2.5; .5 MG/3ML; MG/3ML
3 SOLUTION RESPIRATORY (INHALATION) ONCE AS NEEDED
Status: DISCONTINUED | OUTPATIENT
Start: 2024-12-16 | End: 2024-12-16 | Stop reason: HOSPADM

## 2024-12-16 RX ORDER — PREDNISOLONE ACETATE 10 MG/ML
1 SUSPENSION/ DROPS OPHTHALMIC 4 TIMES DAILY
Start: 2024-12-16

## 2024-12-16 RX ORDER — ACETAZOLAMIDE 500 MG/1
500 CAPSULE, EXTENDED RELEASE ORAL ONCE
Status: COMPLETED | OUTPATIENT
Start: 2024-12-16 | End: 2024-12-16

## 2024-12-16 RX ADMIN — ACETAZOLAMIDE 500 MG: 500 CAPSULE ORAL at 11:21

## 2024-12-16 RX ADMIN — TETRACAINE HYDROCHLORIDE 1 DROP: 5 SOLUTION OPHTHALMIC at 08:14

## 2024-12-16 RX ADMIN — MIDAZOLAM HYDROCHLORIDE 2 MG: 1 INJECTION, SOLUTION INTRAMUSCULAR; INTRAVENOUS at 10:54

## 2024-12-16 RX ADMIN — Medication 1 DROP: at 08:16

## 2024-12-16 RX ADMIN — Medication 20 MG: at 10:37

## 2024-12-16 RX ADMIN — Medication 20 MG: at 10:54

## 2024-12-16 RX ADMIN — Medication 1 DROP: at 08:26

## 2024-12-16 RX ADMIN — FENTANYL CITRATE 50 MCG: 50 INJECTION, SOLUTION INTRAMUSCULAR; INTRAVENOUS at 10:54

## 2024-12-16 RX ADMIN — MIDAZOLAM HYDROCHLORIDE 2 MG: 1 INJECTION, SOLUTION INTRAMUSCULAR; INTRAVENOUS at 10:37

## 2024-12-16 RX ADMIN — Medication 1 DROP: at 08:21

## 2024-12-16 RX ADMIN — TETRACAINE HYDROCHLORIDE 1 DROP: 5 SOLUTION OPHTHALMIC at 08:15

## 2024-12-16 RX ADMIN — FENTANYL CITRATE 50 MCG: 50 INJECTION, SOLUTION INTRAMUSCULAR; INTRAVENOUS at 10:37

## 2024-12-16 NOTE — H&P
[facility]         History and Physical    Patient Name: Geraldine Pat  MRN: [id number]  : 1975  Gender: female     HPI: Patient complaint of PPLOV Left eye diagnosed with cataract. Patient requests PHACO PCIOL for Increase of VA/ADL.    History:    Past Medical History:   Diagnosis Date    Arthritis     Asthma     Cataract     Colon polyp     COPD (chronic obstructive pulmonary disease)     Deep vein thrombosis     right leg    Depression     Diabetes mellitus     Hyperlipidemia     Low back pain     Obesity     PVD (peripheral vascular disease)     Substance abuse     Visual impairment        Past Surgical History:   Procedure Laterality Date    ABSCESS DRAINAGE      ANGIOPLASTY / STENTING FEMORAL Right     CARDIAC CATHETERIZATION  2024    no stents needed    CATARACT EXTRACTION W/ INTRAOCULAR LENS IMPLANT Right 10/24/2024    Procedure: CATARACT PHACO EXTRACTION WITH INTRAOCULAR LENS IMPLANT RIGHT;  Surgeon: Suresh Moran MD;  Location: [facility];  Service: Ophthalmology;  Laterality: Right;     SECTION      COLONOSCOPY      SKIN GRAFT      TONSILLECTOMY         Social History     Socioeconomic History    Marital status:    Tobacco Use    Smoking status: Every Day     Current packs/day: 1.00     Average packs/day: 1 pack/day for 40.0 years (40.0 ttl pk-yrs)     Types: Cigarettes    Smokeless tobacco: Never   Vaping Use    Vaping status: Never Used   Substance and Sexual Activity    Alcohol use: Not Currently     Comment: Quit 16    Drug use: Not Currently     Types: Amphetamines, Methamphetamines    Sexual activity: Defer     Comment: Non att       Family History   Problem Relation Age of Onset    Alcohol abuse Mother     Depression Mother     Diabetes Mother     Drug abuse Mother     Hearing loss Mother     Hyperlipidemia Mother     Learning disabilities Mother     Mental illness Mother     Developmental Disability Father     Diabetes  Father     Drug abuse Father     Heart disease Father     Hyperlipidemia Father     Mental illness Father     Obesity Father     Diabetes Sister     Cancer Sister     Drug abuse Sister        Prior to Admission Medications:  Medications Prior to Admission   Medication Sig Dispense Refill Last Dose/Taking    Alcohol Swabs (Alcohol Prep) 70 % pads    12/15/2024    ASPIRIN 81 PO Daily.   12/15/2024 Morning    atorvastatin (LIPITOR) 80 MG tablet Take 1 tablet by mouth Daily.   12/15/2024 Evening    Atrovent HFA 17 MCG/ACT inhaler As Needed.   12/15/2024 Morning    baclofen (LIORESAL) 10 MG tablet Take 1 tablet by mouth 3 times a day. 60 tablet 3 12/15/2024 Evening    BD Pen Needle Desiree 2nd Gen 32G X 4 MM misc    12/15/2024    Continuous Glucose Sensor (FreeStyle Hunter 3 Sensor) misc    12/15/2024    gabapentin (NEURONTIN) 300 MG capsule Take 1 capsule by mouth Every 12 (Twelve) Hours. 60 capsule 3 12/15/2024 Evening    insulin glulisine (Apidra) 100 UNIT/ML injection Inject  under the skin into the appropriate area as directed 3 (Three) Times a Day Before Meals. Sliding scale up to 30 units   12/15/2024 Evening    Jardiance 25 MG tablet tablet Take 1 tablet by mouth Daily.   12/15/2024 Morning    Lantus SoloStar 100 UNIT/ML injection pen Inject 50 Units under the skin into the appropriate area as directed 2 (Two) Times a Day.   12/15/2024 Noon    Plavix 75 MG tablet Take 1 tablet by mouth Daily.   12/15/2024 Morning    prednisoLONE acetate (Pred Forte) 1 % ophthalmic suspension Administer 1 drop to the right eye 4 (Four) Times a Day.   12/15/2024    PROzac 40 MG capsule Take 1 capsule by mouth Daily.   12/15/2024 Morning    nitroglycerin (NITROSTAT) 0.4 MG SL tablet take 1 tablet by mouth every 5 minutes if needed chest pain (MAX ...  (REFER TO PRESCRIPTION NOTES). (Patient not taking: Reported on 12/13/2024)   Not Taking       Allergies:  Allergies   Allergen Reactions    Naproxen Rash    Quetiapine Delirium and  Confusion    Salsalate Rash    Vancomycin Rash        Vitals: Temp:  [97.3 °F (36.3 °C)] 97.3 °F (36.3 °C)  Heart Rate:  [50] 50  Resp:  [16] 16  BP: (116)/(65) 116/65    Review of Systems:   Within Normal Limits Abnormal   HEENT [x]    []     Cardiovascular [x]   []     Gastrointestinal [x]   []     Genitourinary [x]   []     Neurologic [x]   []     Pulmonary [x]   []       Physical Exam:   Within Normal Limits Abnormal   HEENT [x]    []     Heart [x]   []     Lungs [x]   []     Abdomen [x]   []     Extremities [x]   []       Impression: Left nuclear sclerotic cataract.     Plan: CATARACT PHACO EXTRACTION WITH INTRAOCULAR LENS IMPLANT LEFT (Left)     Suresh Moran MD  12/16/2024

## 2024-12-16 NOTE — ANESTHESIA POSTPROCEDURE EVALUATION
Patient: Geraldine Pat    Procedure Summary       Date: 12/16/24 Room / Location: [facility] OR 3 /  [facility]    Anesthesia Start: 1033 Anesthesia Stop: 1105    Procedure: CATARACT PHACO EXTRACTION WITH INTRAOCULAR LENS IMPLANT LEFT (Left: Eye) Diagnosis:       Nuclear sclerotic cataract of left eye      (Nuclear sclerotic cataract of left eye [H25.12])    Surgeons: Suresh Moran MD Provider: Patience Bearden CRNA    Anesthesia Type: MAC ASA Status: 3            Anesthesia Type: MAC    Vitals  Vitals Value Taken Time   /70 12/16/24 1132   Temp 97.3 °F (36.3 °C) 12/16/24 1112   Pulse 73 12/16/24 1132   Resp 16 12/16/24 1132   SpO2 97 % 12/16/24 1132           Post Anesthesia Care and Evaluation    Patient location during evaluation: bedside  Patient participation: complete - patient participated  Level of consciousness: awake and alert  Pain score: 0  Pain management: satisfactory to patient    Airway patency: patent  Anesthetic complications: No anesthetic complications  PONV Status: none  Cardiovascular status: acceptable and stable  Respiratory status: acceptable  Hydration status: acceptable    Comments: Vitals signs as noted in nursing documentation as per protocol.

## 2024-12-16 NOTE — DISCHARGE INSTRUCTIONS
[facility]  [address] [place] [address]  (P): [phone]           (F): [phone]    Geraldine Pat  PATIENT NAME:__________________________________    Left Eye    POST OPERATIVE INSTRUCTIONS    You have received anesthesia today. DO NOT drive, drink alcohol, sign legal documents.   After surgery, your eye will not hurt. It may feel scratchy, sticky or uncomfortable. Your eye will be sensitive to light.  Most people see better 1-3 days after the procedure, but it could take 3 weeks to get the full benefits and reach your visual potential. If your vision is blurry for a few days it is normal, and means you may have swelling outside or inside the eye. For some patients, a bubble is placed and there will be blurriness.   You should receive a post-op kit with tape and an eye shield. Wear the shield for the first 3 nights after surgery to keep you from rubbing the eye.  Most people are able to return to their normal routine 1-3 days after surgery, however, due to the brain adjusting to your new vision you may have trouble judging distances and want to be careful when driving and going up and downstairs.   You can shower and wash your hair the day after surgery. Keep water, shampoo, hair spray and shaving lotion out of the eye, especially for the first week.  During the first week, you should AVOID:   Rubbing or putting pressure on your eye.  Eye make-up, face cream or lotion, hair coloring or perms  Strenuous activities, such as running or lifting weights, as to avoid sweat from getting in the eye. Avoid swimming, hot tubs, fumes or michel conditions.   Keep your head above your heart (no hanging the head down for periods of time).  Some discomfort and blurred vision after surgery are normal, but if you have any unusual pain, swelling, bleeding or sudden decrease in vision, contact our office immediately. Emergency assistance is available at any time by calling:    Dr. Suresh Loo  SAMANTHA Moran    [phone]    If unable to reach call [facility] @ [phone]      POST OPERATIVE DROP INSTRUCTIONS  You have been prescribed eye drops to use after surgery, please follow these instructions:  PLACE A PARDEEP IN THE DAY COLUMN EACH TIME YOU USE TO KEEP ON SCHEDULE. WAIT 5 MINUTES IN BETWEEN DROPS            Prednisolone (PINK TOP) SHAKE WELL BEFORE USE   GIVEN TO YOU BY THE HOSPITAL    WEEK 1-USE 4  (FOUR) TIMES A DAY DAY 1   DAY 2 DAY 3 DAY 4 DAY 5 DAY 6 DAY 7     WEEK 2-USE 3 (THREE)  TIMES A DAY DAY 1 DAY 2 DAY 3 DAY 4 DAY 5 DAY 6 DAY 7     WEEK 3-USE 2 (TWO) TIMES A DAY DAY 1 DAY 2 DAY 3 DAY 4 DAY 5 DAY 6 DAY 7     WEEK 4-USE 1 (ONE)TIME A DAY DAY 1 DAY 2 DAY 3 DAY 4 DAY 5 DAY 6 DAY 7         Ketorolac (GRAY TOP) PRESCRIBED TO YOUR PHARMACY    WEEK 1-USE 4  (FOUR) TIMES A DAY DAY 1   DAY 2 DAY 3 DAY 4 DAY 5 DAY 6 DAY 7     WEEK 2-USE 3 (THREE)  TIMES A DAY DAY 1 DAY 2 DAY 3 DAY 4 DAY 5 DAY 6 DAY 7     WEEK 3-USE 2 (TWO) TIMES A DAY DAY 1 DAY 2 DAY 3 DAY 4 DAY 5 DAY 6 DAY 7     WEEK 4-USE 1 (ONE)TIME A DAY DAY 1 DAY 2 DAY 3 DAY 4 DAY 5 DAY 6 DAY 7       Moxifloxacin (TAN TOP) PRESCRIBED TO YOUR PHARMACY    WEEK 1-USE 4  (FOUR) TIMES A DAY DAY 1   DAY 2 DAY 3 DAY 4 DAY 5 DAY 6 DAY 7     No pushing, pulling, tugging,  heavy lifting, or strenuous activity.  No major decision making, driving, or drinking alcoholic beverages for 24 hours. ( due to the medications you have  received)  Always use good hand hygiene/washing techniques.  NO driving while taking pain medications.    To assist you in voiding:  Drink plenty of fluids  Listen to running water while attempting to void.    If you are unable to urinate and you have an uncomfortable urge to void or it has been   6 hours since you were discharged, return to the Emergency Room

## 2024-12-16 NOTE — ANESTHESIA PREPROCEDURE EVALUATION
Anesthesia Evaluation     Patient summary reviewed and Nursing notes reviewed   NPO Solid Status: > 8 hours  NPO Liquid Status: > 4 hours           Airway   Mallampati: II  TM distance: >3 FB  Neck ROM: full  Possible difficult intubation  Dental - normal exam     Pulmonary - normal exam   (+) COPD moderate, asthma,  Cardiovascular - normal exam    PT is on anticoagulation therapy  Rhythm: regular  Rate: normal    (+) PVD, DVT resolved, hyperlipidemia    ROS comment: 2/2024  Stress test with myocardial perfusion          No EKG or echo evidence of ischemia after 82% max predicted heart rate achieved.   Normal resting LV size and systolic function, with hyperdynamic EF post-Dobutamine (EF >75%).   Chest pressure at peak Dobutamine, similar to chest pressure she has been experiencing.   Recommendations: Given heavy smoker, PVD, diffuse coronary calcification and typical angina and an abnormal baseline                    EKG she was given sublingual nitroglycerin and should be referred to Cardiology for consideration of cath.                    We discussed smoking cessation and DAPT was continued.       Neuro/Psych  (+) psychiatric history Depression  GI/Hepatic/Renal/Endo    (+) obesity, morbid obesity (bmi 41.3), diabetes mellitus    Musculoskeletal     (+) back pain  Abdominal  - normal exam   Substance History   (+) drug use     OB/GYN          Other   arthritis,                   Anesthesia Plan    ASA 3     MAC     (Risks and benefits discussed including risk of aspiration, recall and dental damage. All patient questions answered.    Will continue with plan of care.)  intravenous induction     Anesthetic plan, risks, benefits, and alternatives have been provided, discussed and informed consent has been obtained with: patient.  Pre-procedure education provided    CODE STATUS:

## 2024-12-16 NOTE — OP NOTE
OPERATIVE NOTE    Date of Procedure: 12/16/2024  Patient Name: Geraldine Pat  Patient MRN: [id number]  YOB: 1975     Preoperative Diagnosis: Left nuclear sclerotic cataract.     Postoperative Diagnosis: Left nuclear sclerotic cataract.     Procedure Performed: Phacoemulsification with implantation of a foldable posterior chamber intraocular lens, Left eye.     Surgeon: Suresh Moran MD    Anesthesia:  Monitored Anesthesia Care (MAC)      Brief History and Indication: The patient presents with a history of past progressive loss of vision.  Patient was diagnosed with cataract and requests removal for increased ability to read and see.     Operation Description: The patient was taken to the OR and prepped and draped in the usual sterile ophthalmic fashion. A lid speculum was placed in the eye.  A #75 blade was then used to make a stab incision two o’clock hours from the intended temporal clear cornea groove. The anterior chamber was then inflated with a Viscoelastic. A metal microkeratome blade was then used to enter the anterior chamber at the temporal clear cornea site. A three level tunnel incision was made. A curvilinear capsulorrhexis was then performed with a bent cystotome needle and capsulorrhexis forceps.  BSS on a 30 gauge bent cannula was used to hydro-dissect, and hydro-delineate the lens. Good fluid waves and hydro-delineation were noted. Phacoemulsification was then used to remove nuclear material without complications. The residual cortical and lenticular material was then removed with irrigation and aspiration. Viscoelastics were then used to inflate the bag in a soft shell technique. A PCIOL was injected into the bag. Post-implantation, there were no rents or tears in the bag and the lens was noted to be stable and centered. The residual Viscoelastic was then removed with irrigation and aspiration.  The wound was checked and found to be without leaks. Therefore a Polydex ointment and  one drop of Durezol eye drop was placed in the eye.     Implant Information:   Implant Name Type Inv. Item Serial No.  Lot No. LRB No. Used Action   LENS MONOFOCAL IQ [id number] - [id number] - [id number] Implant LENS MONOFOCAL IQ [id number] [id number] NA Left 1 Implanted       Complications: None    Estimated Blood Loss:  Less than 1 cc.       []   Changed to complex procedure due to: []  hypermature, white cataract. Blue dye was used. []   small iris. A Malyugin Ring was used.    Discharge and Condition  The patient was transported to same day surgery in excellent condition and scheduled for follow-up tomorrow morning. The patient was given instructions on use of eye drops for the operative eye and was specifically instructed to call Dr. Moran at his office or home for any nausea, vomiting, headache, decreased visual acuity, or pain, or if the patient had any general concerns.    Suresh Moran MD  12/16/2024

## 2025-01-07 ENCOUNTER — TELEMEDICINE (OUTPATIENT)
Dept: FAMILY MEDICINE CLINIC | Facility: CLINIC | Age: 50
End: 2025-01-07
Payer: MEDICAID

## 2025-01-07 DIAGNOSIS — J45.20 MILD INTERMITTENT ASTHMA WITHOUT COMPLICATION: ICD-10-CM

## 2025-01-07 DIAGNOSIS — G89.29 CHRONIC LOW BACK PAIN WITH SCIATICA, SCIATICA LATERALITY UNSPECIFIED, UNSPECIFIED BACK PAIN LATERALITY: Primary | ICD-10-CM

## 2025-01-07 DIAGNOSIS — M54.40 CHRONIC LOW BACK PAIN WITH SCIATICA, SCIATICA LATERALITY UNSPECIFIED, UNSPECIFIED BACK PAIN LATERALITY: Primary | ICD-10-CM

## 2025-01-07 PROCEDURE — 99213 OFFICE O/P EST LOW 20 MIN: CPT | Performed by: PHYSICIAN ASSISTANT

## 2025-01-07 PROCEDURE — 1125F AMNT PAIN NOTED PAIN PRSNT: CPT | Performed by: PHYSICIAN ASSISTANT

## 2025-01-07 RX ORDER — ALBUTEROL SULFATE 90 UG/1
2 INHALANT RESPIRATORY (INHALATION) EVERY 4 HOURS PRN
Qty: 6.7 G | Refills: 1 | Status: SHIPPED | OUTPATIENT
Start: 2025-01-07

## 2025-01-07 RX ORDER — GABAPENTIN 300 MG/1
300 CAPSULE ORAL EVERY 12 HOURS
Qty: 60 CAPSULE | Refills: 3 | Status: SHIPPED | OUTPATIENT
Start: 2025-01-07

## 2025-01-07 NOTE — PROGRESS NOTES
Subjective   Geraldine Pat is a 49 y.o. female      Spinal stenosis/chronic back pain    Patient presents and consents for telehealth/video visit examination.   I am at the office patient is at home address    History of Present Illness  History of Present Illness    Patient is a pleasant 49-year-old white female who comes in for follow-up of chronic back pain spinal stenosis patient's pain with flexion extension patient takes gabapentin 3 mg twice a day meds working well no problems or complaints no shortness of breath no chest pain back pain is controlled.      Patient also needs refill of albuterol does have asthma    The following portions of the patient's history were reviewed and updated as appropriate: allergies, current medications, past social history and problem list    Review of Systems   Constitutional:  Negative for chills, diaphoresis, fatigue and fever.   HENT:  Positive for congestion and sore throat.    Respiratory:  Positive for cough.    Cardiovascular:  Negative for chest pain.   Gastrointestinal:  Negative for abdominal pain, nausea and vomiting.   Genitourinary:  Negative for dysuria.   Musculoskeletal:  Positive for myalgias. Negative for neck pain.   Skin:  Negative for rash.   Neurological:  Positive for headaches. Negative for weakness and numbness.       Objective     There were no vitals filed for this visit.    Physical Exam  Constitutional:       Appearance: Normal appearance. She is normal weight.   Neurological:      Mental Status: She is alert.   Psychiatric:         Mood and Affect: Mood normal.         Behavior: Behavior normal.         Thought Content: Thought content normal.         Judgment: Judgment normal.       Physical Exam      Assessment & Plan   Assessment & Plan      Diagnoses and all orders for this visit:    1. Chronic low back pain with sciatica, sciatica laterality unspecified, unspecified back pain laterality (Primary)  -     gabapentin (NEURONTIN) 300 MG capsule;  Take 1 capsule by mouth Every 12 (Twelve) Hours.  Dispense: 60 capsule; Refill: 3    2. Mild intermittent asthma without complication    Other orders  -     albuterol sulfate  (90 Base) MCG/ACT inhaler; Inhale 2 puffs Every 4 (Four) Hours As Needed for Wheezing or Shortness of Air.  Dispense: 6.7 g; Refill: 1     As part of this patient's treatment plan, patient will be prescribed controlled substances. The patient has been made aware of appropriate use of such medications, including potential risk of somnolence, limited ability to drive and /or work safely, and potential for dependence or overdose. It has also been made clear that these medications are for use by this patient only, without concomitant use of alcohol or other substances unless prescribed.Controlled substance status of medication discussed with patient, discussed risks of medication including abuse potential and diversion potential and need to follow up for reevaluation appointment in order to receive further refills.    Part of this note may be an electronic transcription/translation of spoken language to printed text using the Dragon Dictation System.       Patient or patient representative verbalized consent for the use of Ambient Listening during the visit with  KARRI Patrick for chart documentation. 1/7/2025  10:23 EST  Answers submitted by the patient for this visit:  Primary Reason for Visit (Submitted on 1/7/2025)  What is the primary reason for your visit?: Problem Not Listed

## 2025-01-08 DIAGNOSIS — Z79.4 TYPE 2 DIABETES MELLITUS WITH HYPERGLYCEMIA, WITH LONG-TERM CURRENT USE OF INSULIN: Primary | ICD-10-CM

## 2025-01-08 DIAGNOSIS — E11.65 TYPE 2 DIABETES MELLITUS WITH HYPERGLYCEMIA, WITH LONG-TERM CURRENT USE OF INSULIN: Primary | ICD-10-CM

## 2025-01-08 RX ORDER — PEN NEEDLE, DIABETIC 32GX 5/32"
NEEDLE, DISPOSABLE MISCELLANEOUS
Qty: 200 EACH | Refills: 11 | Status: SHIPPED | OUTPATIENT
Start: 2025-01-08

## 2025-01-09 DIAGNOSIS — M54.40 CHRONIC LOW BACK PAIN WITH SCIATICA, SCIATICA LATERALITY UNSPECIFIED, UNSPECIFIED BACK PAIN LATERALITY: ICD-10-CM

## 2025-01-09 DIAGNOSIS — G89.29 CHRONIC LOW BACK PAIN WITH SCIATICA, SCIATICA LATERALITY UNSPECIFIED, UNSPECIFIED BACK PAIN LATERALITY: ICD-10-CM

## 2025-01-09 RX ORDER — BACLOFEN 10 MG/1
10 TABLET ORAL 3 TIMES DAILY
Qty: 60 TABLET | Refills: 3 | Status: SHIPPED | OUTPATIENT
Start: 2025-01-09

## 2025-01-22 ENCOUNTER — OFFICE VISIT (OUTPATIENT)
Dept: ENDOCRINOLOGY | Facility: CLINIC | Age: 50
End: 2025-01-22

## 2025-01-22 ENCOUNTER — PRIOR AUTHORIZATION (OUTPATIENT)
Dept: ENDOCRINOLOGY | Facility: CLINIC | Age: 50
End: 2025-01-22

## 2025-01-22 VITALS
HEIGHT: 66 IN | OXYGEN SATURATION: 93 % | HEART RATE: 80 BPM | RESPIRATION RATE: 16 BRPM | SYSTOLIC BLOOD PRESSURE: 124 MMHG | DIASTOLIC BLOOD PRESSURE: 72 MMHG | BODY MASS INDEX: 43.81 KG/M2 | WEIGHT: 272.6 LBS

## 2025-01-22 DIAGNOSIS — Z79.4 TYPE 2 DIABETES MELLITUS WITH HYPERGLYCEMIA, WITH LONG-TERM CURRENT USE OF INSULIN: Primary | ICD-10-CM

## 2025-01-22 DIAGNOSIS — E78.5 HYPERLIPIDEMIA, UNSPECIFIED HYPERLIPIDEMIA TYPE: ICD-10-CM

## 2025-01-22 DIAGNOSIS — E11.65 TYPE 2 DIABETES MELLITUS WITH HYPERGLYCEMIA, WITH LONG-TERM CURRENT USE OF INSULIN: Primary | ICD-10-CM

## 2025-01-22 LAB
ALBUMIN UR-MCNC: <1.2 MG/DL
CREAT UR-MCNC: 70 MG/DL
EXPIRATION DATE: ABNORMAL
EXPIRATION DATE: ABNORMAL
GLUCOSE BLDC GLUCOMTR-MCNC: 131 MG/DL (ref 70–130)
HBA1C MFR BLD: 9.4 % (ref 4.5–5.7)
Lab: ABNORMAL
Lab: ABNORMAL
MICROALBUMIN/CREAT UR: NORMAL MG/G{CREAT}

## 2025-01-22 PROCEDURE — 95251 CONT GLUC MNTR ANALYSIS I&R: CPT | Performed by: INTERNAL MEDICINE

## 2025-01-22 PROCEDURE — 99204 OFFICE O/P NEW MOD 45 MIN: CPT | Performed by: INTERNAL MEDICINE

## 2025-01-22 PROCEDURE — 3046F HEMOGLOBIN A1C LEVEL >9.0%: CPT | Performed by: INTERNAL MEDICINE

## 2025-01-22 PROCEDURE — 1159F MED LIST DOCD IN RCRD: CPT | Performed by: INTERNAL MEDICINE

## 2025-01-22 PROCEDURE — 1160F RVW MEDS BY RX/DR IN RCRD: CPT | Performed by: INTERNAL MEDICINE

## 2025-01-22 PROCEDURE — 82570 ASSAY OF URINE CREATININE: CPT | Performed by: INTERNAL MEDICINE

## 2025-01-22 PROCEDURE — 83036 HEMOGLOBIN GLYCOSYLATED A1C: CPT | Performed by: INTERNAL MEDICINE

## 2025-01-22 PROCEDURE — 82043 UR ALBUMIN QUANTITATIVE: CPT | Performed by: INTERNAL MEDICINE

## 2025-01-22 RX ORDER — TIRZEPATIDE 2.5 MG/.5ML
2.5 INJECTION, SOLUTION SUBCUTANEOUS WEEKLY
Qty: 2 ML | Refills: 1 | Status: SHIPPED | OUTPATIENT
Start: 2025-01-22 | End: 2025-01-23

## 2025-01-22 RX ORDER — TIRZEPATIDE 2.5 MG/.5ML
2.5 INJECTION, SOLUTION SUBCUTANEOUS WEEKLY
Qty: 2 ML | Refills: 1 | Status: SHIPPED | OUTPATIENT
Start: 2025-01-22 | End: 2025-01-22 | Stop reason: SDUPTHER

## 2025-01-22 NOTE — TELEPHONE ENCOUNTER
Pharmacy called in regards to Tirzepatide (Mounjaro) 2.5 MG/0.5ML . Requesting new script with ICD10. Pharmacy also advise patient is on Lantus  and wants to confirm is okay with taking Mounjaro.     Please advise  [phone]

## 2025-01-22 NOTE — PROGRESS NOTES
Chief Complaint   Patient presents with    Diabetes    Establish Care        New patient who is being seen in consultation regarding diabetes at the request of Obed Mcguire,*     HPI   Geraldine Pat is a 49 y.o. female who presents to establish care for diabetes.    Patient has a history of type 2 diabetes.  This was initially diagnosed at age 19 during pregnancy.    Current medical therapy includes the following:  Jardiance 25 mg daily  Lantus 50 units twice daily  Apidra per correctional scale, she reports she takes at least 10 units.     This regimen was last changed years ago.      Patient has also previously taken the following medications which were discontinued:  Metformin due to gastrointestinal side effects  Glyburide    She previously took ozempic when she was in [place].  She ran out of refills when she relocated.  She did not have adverse effect of this therapy.    Patient generally eats only 1 meal daily.  She does drink coffee and creamer throughout the day.  Patient monitors blood glucose using CGM.  Patient does not exercise regularly.     Patient's last dilated eye exam was within the past year.  Patient denies acute visual changes. She reports recent eye injection with [facility] [place].   History of dyslipidemia: Yes.  Patient has had stent placement in her leg.  History of renal dysfunction: No  History of Hypertension: No     Past Medical History:   Diagnosis Date    Arthritis     Asthma     Cataract     Colon polyp     COPD (chronic obstructive pulmonary disease)     Deep vein thrombosis 2022    right leg    Depression     Diabetes mellitus     Hyperlipidemia     Low back pain     Obesity     PVD (peripheral vascular disease)     Substance abuse 1989    Type 2 diabetes mellitus 1994    Visual impairment      Past Surgical History:   Procedure Laterality Date    ABSCESS DRAINAGE      ANGIOPLASTY / STENTING FEMORAL Right     CARDIAC CATHETERIZATION  02/2024    no stents needed     CATARACT EXTRACTION W/ INTRAOCULAR LENS IMPLANT Right 10/24/2024    Procedure: CATARACT PHACO EXTRACTION WITH INTRAOCULAR LENS IMPLANT RIGHT;  Surgeon: Suresh Moran MD;  Location: [facility] OR;  Service: Ophthalmology;  Laterality: Right;    CATARACT EXTRACTION W/ INTRAOCULAR LENS IMPLANT Left 2024    Procedure: CATARACT PHACO EXTRACTION WITH INTRAOCULAR LENS IMPLANT LEFT;  Surgeon: Suresh Moran MD;  Location: [facility] OR;  Service: Ophthalmology;  Laterality: Left;     SECTION      COLONOSCOPY      SKIN GRAFT      TONSILLECTOMY        Family History   Problem Relation Age of Onset    Alcohol abuse Mother     Depression Mother     Diabetes Mother     Drug abuse Mother     Hearing loss Mother     Hyperlipidemia Mother     Learning disabilities Mother     Mental illness Mother     Developmental Disability Father     Diabetes Father     Drug abuse Father     Heart disease Father     Hyperlipidemia Father     Mental illness Father     Obesity Father     Diabetes Sister     Cancer Sister     Drug abuse Sister     Diabetes Sister       Social History     Socioeconomic History    Marital status:    Tobacco Use    Smoking status: Every Day     Current packs/day: 1.00     Average packs/day: 1 pack/day for 40.0 years (40.0 ttl pk-yrs)     Types: Cigarettes    Smokeless tobacco: Never   Vaping Use    Vaping status: Never Used   Substance and Sexual Activity    Alcohol use: Not Currently     Comment: Quit 16    Drug use: Not Currently     Types: Amphetamines, Methamphetamines    Sexual activity: Not Currently     Birth control/protection: Post-menopausal     Comment: Non att      Allergies   Allergen Reactions    Naproxen Rash    Quetiapine Delirium and Confusion    Salsalate Rash    Vancomycin Rash      Current Outpatient Medications on File Prior to Visit   Medication Sig Dispense Refill    albuterol sulfate  (90 Base) MCG/ACT inhaler Inhale 2 puffs Every 4 (Four) Hours As  Needed for Wheezing or Shortness of Air. 6.7 g 1    Alcohol Swabs (Alcohol Prep) 70 % pads       ASPIRIN 81 PO Daily.      atorvastatin (LIPITOR) 80 MG tablet Take 1 tablet by mouth Daily.      Atrovent HFA 17 MCG/ACT inhaler As Needed.      baclofen (LIORESAL) 10 MG tablet TAKE 1 TABLET BY MOUTH 3 TIMES A DAY. 60 tablet 3    Continuous Glucose Sensor (FreeStyle Hunter 3 Sensor) misc       gabapentin (NEURONTIN) 300 MG capsule Take 1 capsule by mouth Every 12 (Twelve) Hours. 60 capsule 3    insulin glulisine (Apidra) 100 UNIT/ML injection Inject  under the skin into the appropriate area as directed 3 (Three) Times a Day Before Meals. Sliding scale up to 30 units      Insulin Pen Needle (BD Pen Needle Desiree 2nd Gen) 32G X 4 MM misc Use to administer insulin daily. 200 each 11    Jardiance 25 MG tablet tablet Take 1 tablet by mouth Daily.      Lantus SoloStar 100 UNIT/ML injection pen Inject 50 Units under the skin into the appropriate area as directed 2 (Two) Times a Day.      nitroglycerin (NITROSTAT) 0.4 MG SL tablet take 1 tablet by mouth every 5 minutes if needed chest pain (MAX ...  (REFER TO PRESCRIPTION NOTES). (Patient not taking: Reported on 12/13/2024)      Plavix 75 MG tablet Take 1 tablet by mouth Daily.      prednisoLONE acetate (Pred Forte) 1 % ophthalmic suspension Administer 1 drop to the right eye 4 (Four) Times a Day.      PROzac 40 MG capsule Take 1 capsule by mouth Daily.      [DISCONTINUED] prednisoLONE acetate (Pred Forte) 1 % ophthalmic suspension Administer 1 drop into the left eye 4 (Four) Times a Day.       No current facility-administered medications on file prior to visit.        Review of Systems   Constitutional:  Positive for fatigue.   HENT:  Positive for trouble swallowing.    Eyes:  Positive for photophobia, pain and visual disturbance.   Cardiovascular:  Positive for chest pain (not current) and leg swelling.   Gastrointestinal:  Positive for constipation and diarrhea.   Endocrine:  "Positive for polydipsia.   Musculoskeletal:  Positive for arthralgias, back pain, joint swelling and myalgias.   Neurological:  Positive for weakness.        Vitals:    01/22/25 0822   BP: 124/72   BP Location: Left arm   Patient Position: Sitting   Pulse: 80   Resp: 16   SpO2: 93%   Weight: 124 kg (272 lb 9.6 oz)   Height: 168.9 cm (66.5\")   Body mass index is 43.34 kg/m².     Physical Exam  Vitals reviewed.   Constitutional:       General: She is not in acute distress.  Cardiovascular:      Rate and Rhythm: Normal rate and regular rhythm.   Pulmonary:      Effort: Pulmonary effort is normal.      Breath sounds: Normal breath sounds.   Neurological:      Mental Status: She is alert.   Psychiatric:         Mood and Affect: Mood and affect normal.         Behavior: Behavior is cooperative.         Labs/Imaging  A1C:  Lab Results   Component Value Date    HGBA1C 9.4 (A) 01/22/2025    HGBA1C 9.60 (H) 08/21/2024      Glucose:  Lab Results   Component Value Date    POCGLU 131 (A) 01/22/2025      CMP:  Lab Results   Component Value Date    GLUCOSE 139 (H) 08/21/2024    BUN 11 08/21/2024    CREATININE 0.76 08/21/2024     08/21/2024    K 4.0 08/21/2024     08/21/2024    CALCIUM 9.7 08/21/2024    PROTEINTOT 6.9 08/21/2024    ALBUMIN 3.9 08/21/2024    ALT 25 08/21/2024    AST 18 08/21/2024    ALKPHOS 104 08/21/2024    BILITOT 0.6 08/21/2024    GLOB 3.0 08/21/2024    AGRATIO 1.3 08/21/2024    BCR 14.5 08/21/2024    ANIONGAP 11.0 08/21/2024    EGFR 95.0 08/21/2024      Lipid Panel:  Lab Results   Component Value Date    CHOL 175 08/21/2024    TRIG 158 (H) 08/21/2024    HDL 44 08/21/2024     (H) 08/21/2024      Freestyle jovon downloaded for review for dates ranging January 9 of January 22, 2025, CGM is active 41% of the time with average glucose 221, GMI 8.6% with 34.1% glucose variability.  34% of data is within target range, 34% high, 32% very high.  Patient has pattern of postprandial hyperglycemia which " is most prominent in the afternoon/evening.    Assessment and Plan    Diagnoses and all orders for this visit:    1. Type 2 diabetes mellitus with hyperglycemia, with long-term current use of insulin (Primary)  -     POC Glycosylated Hemoglobin (Hb A1C)  -     POC Glucose, Blood  Diabetes is poorly controlled with hemoglobin A1c 9.4%.  Glucose 131 during office visit  CGM containing 72 hours of glucose data was downloaded and reviewed  Patient has postprandial hyperglycemia.  We discussed options for improving glycemic control via medication.    Discussed options to improve mealtime coverage  Patient previously used GLP-1 and is interested in resuming therapy.  Prescription was sent for Mounjaro 2.5 mg weekly.  Potential adverse effects at this class of medications were reviewed including gastrointestinal side effects, risk of pancreatitis and data regarding medullary thyroid cancer.  Patient denies any personal or family history of medullary thyroid cancer.  She denies any history of pancreatitis.  Plan to continue Lantus 50 units twice daily  Patient will increase Apidra to 15 units with meals plus correction factor 50 greater than 150.  Discussed goals for glucose control and potential negative impact of poorly controlled diabetes.  CMP, lipid panel reviewed from August 2024.  Urine microalbumin is due and was ordered today    2. Hyperlipidemia, unspecified hyperlipidemia type  Lipid panel is up-to-date, patient will continue atorvastatin       Return in about 3 months (around 4/22/2025) for Next scheduled follow up. The patient was instructed to contact the clinic with any interval questions or concerns.    Electronically signed by: Oxana Rodríguez MD     Dictated Utilizing Dragon Dictation

## 2025-01-23 RX ORDER — SEMAGLUTIDE 0.68 MG/ML
0.25 INJECTION, SOLUTION SUBCUTANEOUS WEEKLY
Qty: 3 ML | Refills: 3 | Status: SHIPPED | OUTPATIENT
Start: 2025-01-23 | End: 2025-01-24 | Stop reason: SDUPTHER

## 2025-01-23 NOTE — TELEPHONE ENCOUNTER
Called the pt and she is ok with sending th eOzempic.    Script is pending.Rx Refill Note    Last office visit with prescribing clinician: 1/22/2025       Next office visit with prescribing clinician: 4/22/2025     {

## 2025-01-23 NOTE — TELEPHONE ENCOUNTER
The member has had at least a 3-month trial and failure [drug did not work], allergy, contraindication [harmful for] (including potential drug-drug interactions with other medications) or intolerance [side effect] to two preferred GLP-1 agonists [drug in the same group]: Byetta, Ozempic, Trulicity, Victoza supported by:

## 2025-01-23 NOTE — TELEPHONE ENCOUNTER
Please contact patient and make her aware of denial.  Of the listed agents covered by her insurance, I would favor having her resume Ozempic.  If she is agreeable to this we would have her resume at the 0.25 mg dose for 4 weeks followed by an increase to 0.5 which she will continue until follow-up.  She is agreeable to this please pend for signature.

## 2025-01-23 NOTE — TELEPHONE ENCOUNTER
JYOTI SNYDER (Key: [id number])  PA Case ID #: [id number]  Rx #: [id number]  Need Help? Call us at [phone]  Outcome  Denied on January 22 by [place] [employer] Med[employer] 2017  This request has not been approved. Based on the information we received, you did not meet the specific rule(s) needed to approve this request. In some cases, the requested drug or alternatives offered may have other guideline rules that need to be met. Our guideline named GLP-1 RECEPTOR AGONISTS requires the following rule(s) be met for approval: A. The member has had at least a 3-month trial and failure [drug did not work], allergy, contraindication [harmful for] (including potential drug-drug interactions with other medications) or intolerance [side effect] to two preferred GLP-1 agonists [drug in the same group]: Byetta, Ozempic, Trulicity, Victoza supported by: 1. Claims history confirmation OR 2. Chart notes confirming the preferred failures. This is why your request is denied. Please work with your doctor to use a different medication or get us more information if it will allow us to approve this request. A written notification letter will follow with additional details.  Drug  Mounjaro 2.5MG/0.5ML auto-injectors  ePA cloud logo  Form  Med[employer] [place] Medicaid ePA Form 2017 NCPDP  Original Claim Info  75

## 2025-01-24 RX ORDER — SEMAGLUTIDE 0.68 MG/ML
0.25 INJECTION, SOLUTION SUBCUTANEOUS WEEKLY
Qty: 3 ML | Refills: 3 | Status: SHIPPED | OUTPATIENT
Start: 2025-01-24

## 2025-01-24 NOTE — TELEPHONE ENCOUNTER
[facility] is needing a new prescription for ozempic with the diagnosis code included, sent to them.

## 2025-01-24 NOTE — TELEPHONE ENCOUNTER
Last office visit with prescribing clinician: 1/22/2025       Next office visit with prescribing clinician: 4/22/2025     {

## 2025-01-27 ENCOUNTER — PRIOR AUTHORIZATION (OUTPATIENT)
Dept: ENDOCRINOLOGY | Facility: CLINIC | Age: 50
End: 2025-01-27

## 2025-01-27 NOTE — TELEPHONE ENCOUNTER
JYOTI SNYDER (Key: [id number])  Rx #: [id number]  Ozempic (0.25 or 0.5 MG/DOSE) 2MG/3ML pen-injectors  Form  [employer] [place] [employer] ePA Form 2017 NCPDP  Created  3 days ago  Sent to Plan  1 hour ago  Plan Response  1 hour ago  Submit Clinical Questions  1 hour ago  Determination  Favorable  1 hour ago  Message from Plan  The request has been approved. The authorization is effective from 01/27/2025 to 07/27/2025, as long as the member is enrolled in their current health plan. The request was reviewed and approved by a licensed clinical pharmacist. A written notification letter will follow with additional details.. Authorization Expiration Date: July 27, 2025.

## 2025-02-10 RX ORDER — ALBUTEROL SULFATE 90 UG/1
2 AEROSOL, METERED RESPIRATORY (INHALATION) EVERY 4 HOURS PRN
Qty: 18 G | Refills: 11 | Status: SHIPPED | OUTPATIENT
Start: 2025-02-10

## 2025-03-06 RX ORDER — SEMAGLUTIDE 0.68 MG/ML
0.5 INJECTION, SOLUTION SUBCUTANEOUS WEEKLY
Qty: 3 ML | Refills: 1 | Status: SHIPPED | OUTPATIENT
Start: 2025-03-06 | End: 2025-03-06

## 2025-03-06 RX ORDER — SEMAGLUTIDE 1.34 MG/ML
1 INJECTION, SOLUTION SUBCUTANEOUS WEEKLY
Qty: 3 ML | Refills: 1 | Status: SHIPPED | OUTPATIENT
Start: 2025-03-06 | End: 2025-03-07 | Stop reason: SDUPTHER

## 2025-03-06 NOTE — TELEPHONE ENCOUNTER
Rx Refill Note  Requested Prescriptions      No prescriptions requested or ordered in this encounter        Last office visit with prescribing clinician: 1/22/2025      Next office visit with prescribing clinician: 4/22/2025       Radha Caballero (Jodi)  03/06/25, 15:18 EST     Pt request to increase ozempic to 1mg dose

## 2025-03-06 NOTE — TELEPHONE ENCOUNTER
Rx Refill Note  Requested Prescriptions     Pending Prescriptions Disp Refills    Semaglutide,0.25 or 0.5MG/DOS, (Ozempic, 0.25 or 0.5 MG/DOSE,) 2 MG/3ML solution pen-injector 3 mL 3     Sig: Inject 0.25 mg under the skin into the appropriate area as directed 1 (One) Time Per Week. E11.65      Last office visit with prescribing clinician: 1/22/2025     Next office visit with prescribing clinician: 4/22/2025     Emily Zhang MA  03/06/25, 13:14 EST

## 2025-03-07 RX ORDER — SEMAGLUTIDE 1.34 MG/ML
1 INJECTION, SOLUTION SUBCUTANEOUS WEEKLY
Qty: 3 ML | Refills: 1 | Status: SHIPPED | OUTPATIENT
Start: 2025-03-07

## 2025-04-09 ENCOUNTER — OFFICE VISIT (OUTPATIENT)
Dept: FAMILY MEDICINE CLINIC | Facility: CLINIC | Age: 50
End: 2025-04-09
Payer: MEDICAID

## 2025-04-09 VITALS
HEART RATE: 89 BPM | BODY MASS INDEX: 42.17 KG/M2 | SYSTOLIC BLOOD PRESSURE: 118 MMHG | OXYGEN SATURATION: 96 % | DIASTOLIC BLOOD PRESSURE: 74 MMHG | WEIGHT: 262.4 LBS | RESPIRATION RATE: 16 BRPM | HEIGHT: 66 IN

## 2025-04-09 DIAGNOSIS — Z79.4 TYPE 2 DIABETES MELLITUS WITH HYPERGLYCEMIA, WITH LONG-TERM CURRENT USE OF INSULIN: Primary | ICD-10-CM

## 2025-04-09 DIAGNOSIS — R26.81 GAIT INSTABILITY: ICD-10-CM

## 2025-04-09 DIAGNOSIS — E11.65 TYPE 2 DIABETES MELLITUS WITH HYPERGLYCEMIA, WITH LONG-TERM CURRENT USE OF INSULIN: Primary | ICD-10-CM

## 2025-04-09 PROCEDURE — 1125F AMNT PAIN NOTED PAIN PRSNT: CPT | Performed by: PHYSICIAN ASSISTANT

## 2025-04-09 PROCEDURE — 3046F HEMOGLOBIN A1C LEVEL >9.0%: CPT | Performed by: PHYSICIAN ASSISTANT

## 2025-04-09 PROCEDURE — 99213 OFFICE O/P EST LOW 20 MIN: CPT | Performed by: PHYSICIAN ASSISTANT
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
## 2025-04-10 ENCOUNTER — HOSPITAL ENCOUNTER (EMERGENCY)
Facility: HOSPITAL | Age: 50
Discharge: HOME | End: 2025-04-10
Attending: STUDENT IN AN ORGANIZED HEALTH CARE EDUCATION/TRAINING PROGRAM
Payer: MEDICAID

## 2025-04-10 ENCOUNTER — APPOINTMENT (OUTPATIENT)
Dept: GENERAL RADIOLOGY | Facility: HOSPITAL | Age: 50
End: 2025-04-10
Payer: MEDICAID

## 2025-04-10 ENCOUNTER — APPOINTMENT (OUTPATIENT)
Dept: CT IMAGING | Facility: HOSPITAL | Age: 50
End: 2025-04-10
Payer: MEDICAID

## 2025-04-10 VITALS
DIASTOLIC BLOOD PRESSURE: 70 MMHG | SYSTOLIC BLOOD PRESSURE: 113 MMHG | TEMPERATURE: 98.1 F | HEART RATE: 72 BPM | WEIGHT: 262.35 LBS | HEIGHT: 66 IN | OXYGEN SATURATION: 97 % | RESPIRATION RATE: 18 BRPM | BODY MASS INDEX: 42.16 KG/M2

## 2025-04-10 DIAGNOSIS — M62.838 NECK MUSCLE SPASM: ICD-10-CM

## 2025-04-10 DIAGNOSIS — M25.511 ACUTE PAIN OF RIGHT SHOULDER: ICD-10-CM

## 2025-04-10 DIAGNOSIS — R07.89 RIGHT-SIDED CHEST WALL PAIN: Primary | ICD-10-CM

## 2025-04-10 LAB
ALBUMIN SERPL-MCNC: 4 G/DL (ref 3.5–5.2)
ALBUMIN/GLOB SERPL: 1.3 G/DL
ALP SERPL-CCNC: 99 U/L (ref 39–117)
ALT SERPL W P-5'-P-CCNC: 18 U/L (ref 1–33)
ANION GAP SERPL CALCULATED.3IONS-SCNC: 12.2 MMOL/L (ref 5–15)
AST SERPL-CCNC: 19 U/L (ref 1–32)
B PARAPERT DNA SPEC QL NAA+PROBE: NOT DETECTED
B PERT DNA SPEC QL NAA+PROBE: NOT DETECTED
BASOPHILS # BLD AUTO: 0.06 10*3/MM3 (ref 0–0.2)
BASOPHILS NFR BLD AUTO: 0.4 % (ref 0–1.5)
BILIRUB SERPL-MCNC: 0.7 MG/DL (ref 0–1.2)
BUN SERPL-MCNC: 12 MG/DL (ref 6–20)
BUN/CREAT SERPL: 13.2 (ref 7–25)
C PNEUM DNA NPH QL NAA+NON-PROBE: NOT DETECTED
CALCIUM SPEC-SCNC: 9.4 MG/DL (ref 8.6–10.5)
CHLORIDE SERPL-SCNC: 102 MMOL/L (ref 98–107)
CK SERPL-CCNC: 203 U/L (ref 20–180)
CO2 SERPL-SCNC: 26.8 MMOL/L (ref 22–29)
CREAT SERPL-MCNC: 0.91 MG/DL (ref 0.57–1)
CRP SERPL-MCNC: 0.36 MG/DL (ref 0–0.5)
D-LACTATE SERPL-SCNC: 0.6 MMOL/L (ref 0.5–2)
DEPRECATED RDW RBC AUTO: 47 FL (ref 37–54)
EGFRCR SERPLBLD CKD-EPI 2021: 76.5 ML/MIN/1.73
EOSINOPHIL # BLD AUTO: 0.27 10*3/MM3 (ref 0–0.4)
EOSINOPHIL NFR BLD AUTO: 2 % (ref 0.3–6.2)
ERYTHROCYTE [DISTWIDTH] IN BLOOD BY AUTOMATED COUNT: 13.3 % (ref 12.3–15.4)
ERYTHROCYTE [SEDIMENTATION RATE] IN BLOOD: 6 MM/HR (ref 0–30)
FLUAV SUBTYP SPEC NAA+PROBE: NOT DETECTED
FLUBV RNA ISLT QL NAA+PROBE: NOT DETECTED
GEN 5 1HR TROPONIN T REFLEX: 12 NG/L
GLOBULIN UR ELPH-MCNC: 3 GM/DL
GLUCOSE SERPL-MCNC: 129 MG/DL (ref 65–99)
HADV DNA SPEC NAA+PROBE: NOT DETECTED
HCOV 229E RNA SPEC QL NAA+PROBE: NOT DETECTED
HCOV HKU1 RNA SPEC QL NAA+PROBE: NOT DETECTED
HCOV NL63 RNA SPEC QL NAA+PROBE: NOT DETECTED
HCOV OC43 RNA SPEC QL NAA+PROBE: NOT DETECTED
HCT VFR BLD AUTO: 52.3 % (ref 34–46.6)
HGB BLD-MCNC: 17.2 G/DL (ref 12–15.9)
HMPV RNA NPH QL NAA+NON-PROBE: NOT DETECTED
HOLD SPECIMEN: NORMAL
HOLD SPECIMEN: NORMAL
HPIV1 RNA ISLT QL NAA+PROBE: NOT DETECTED
HPIV2 RNA SPEC QL NAA+PROBE: NOT DETECTED
HPIV3 RNA NPH QL NAA+PROBE: NOT DETECTED
HPIV4 P GENE NPH QL NAA+PROBE: NOT DETECTED
IMM GRANULOCYTES # BLD AUTO: 0.05 10*3/MM3 (ref 0–0.05)
IMM GRANULOCYTES NFR BLD AUTO: 0.4 % (ref 0–0.5)
LIPASE SERPL-CCNC: 22 U/L (ref 13–60)
LYMPHOCYTES # BLD AUTO: 3.92 10*3/MM3 (ref 0.7–3.1)
LYMPHOCYTES NFR BLD AUTO: 28.4 % (ref 19.6–45.3)
M PNEUMO IGG SER IA-ACNC: NOT DETECTED
MAGNESIUM SERPL-MCNC: 2 MG/DL (ref 1.6–2.6)
MCH RBC QN AUTO: 31.2 PG (ref 26.6–33)
MCHC RBC AUTO-ENTMCNC: 32.9 G/DL (ref 31.5–35.7)
MCV RBC AUTO: 94.7 FL (ref 79–97)
MONOCYTES # BLD AUTO: 0.78 10*3/MM3 (ref 0.1–0.9)
MONOCYTES NFR BLD AUTO: 5.7 % (ref 5–12)
NEUTROPHILS NFR BLD AUTO: 63.1 % (ref 42.7–76)
NEUTROPHILS NFR BLD AUTO: 8.7 10*3/MM3 (ref 1.7–7)
NRBC BLD AUTO-RTO: 0 /100 WBC (ref 0–0.2)
PHOSPHATE SERPL-MCNC: 3.7 MG/DL (ref 2.5–4.5)
PLATELET # BLD AUTO: 318 10*3/MM3 (ref 140–450)
PMV BLD AUTO: 10.6 FL (ref 6–12)
POTASSIUM SERPL-SCNC: 4.1 MMOL/L (ref 3.5–5.2)
PROCALCITONIN SERPL-MCNC: 0.03 NG/ML (ref 0–0.25)
PROT SERPL-MCNC: 7 G/DL (ref 6–8.5)
RBC # BLD AUTO: 5.52 10*6/MM3 (ref 3.77–5.28)
RHINOVIRUS RNA SPEC NAA+PROBE: NOT DETECTED
RSV RNA NPH QL NAA+NON-PROBE: NOT DETECTED
SARS-COV-2 RNA RESP QL NAA+PROBE: NOT DETECTED
SODIUM SERPL-SCNC: 141 MMOL/L (ref 136–145)
TROPONIN T NUMERIC DELTA: -1 NG/L
TROPONIN T SERPL HS-MCNC: 13 NG/L
TSH SERPL DL<=0.05 MIU/L-ACNC: 1.15 UIU/ML (ref 0.27–4.2)
WBC NRBC COR # BLD AUTO: 13.78 10*3/MM3 (ref 3.4–10.8)
WHOLE BLOOD HOLD SPECIMEN: NORMAL

## 2025-04-10 PROCEDURE — 82550 ASSAY OF CK (CPK): CPT | Performed by: STUDENT IN AN ORGANIZED HEALTH CARE EDUCATION/TRAINING PROGRAM

## 2025-04-10 PROCEDURE — 83735 ASSAY OF MAGNESIUM: CPT | Performed by: STUDENT IN AN ORGANIZED HEALTH CARE EDUCATION/TRAINING PROGRAM

## 2025-04-10 PROCEDURE — 83690 ASSAY OF LIPASE: CPT | Performed by: STUDENT IN AN ORGANIZED HEALTH CARE EDUCATION/TRAINING PROGRAM

## 2025-04-10 PROCEDURE — 84443 ASSAY THYROID STIM HORMONE: CPT | Performed by: STUDENT IN AN ORGANIZED HEALTH CARE EDUCATION/TRAINING PROGRAM

## 2025-04-10 PROCEDURE — 36415 COLL VENOUS BLD VENIPUNCTURE: CPT

## 2025-04-10 PROCEDURE — 70450 CT HEAD/BRAIN W/O DYE: CPT

## 2025-04-10 PROCEDURE — 80053 COMPREHEN METABOLIC PANEL: CPT | Performed by: STUDENT IN AN ORGANIZED HEALTH CARE EDUCATION/TRAINING PROGRAM

## 2025-04-10 PROCEDURE — 25010000002 ORPHENADRINE CITRATE PER 60 MG: Performed by: STUDENT IN AN ORGANIZED HEALTH CARE EDUCATION/TRAINING PROGRAM

## 2025-04-10 PROCEDURE — 96372 THER/PROPH/DIAG INJ SC/IM: CPT

## 2025-04-10 PROCEDURE — 84100 ASSAY OF PHOSPHORUS: CPT | Performed by: STUDENT IN AN ORGANIZED HEALTH CARE EDUCATION/TRAINING PROGRAM

## 2025-04-10 PROCEDURE — 83605 ASSAY OF LACTIC ACID: CPT | Performed by: STUDENT IN AN ORGANIZED HEALTH CARE EDUCATION/TRAINING PROGRAM

## 2025-04-10 PROCEDURE — 71275 CT ANGIOGRAPHY CHEST: CPT

## 2025-04-10 PROCEDURE — 86140 C-REACTIVE PROTEIN: CPT | Performed by: STUDENT IN AN ORGANIZED HEALTH CARE EDUCATION/TRAINING PROGRAM

## 2025-04-10 PROCEDURE — 84484 ASSAY OF TROPONIN QUANT: CPT | Performed by: STUDENT IN AN ORGANIZED HEALTH CARE EDUCATION/TRAINING PROGRAM

## 2025-04-10 PROCEDURE — 85025 COMPLETE CBC W/AUTO DIFF WBC: CPT | Performed by: STUDENT IN AN ORGANIZED HEALTH CARE EDUCATION/TRAINING PROGRAM

## 2025-04-10 PROCEDURE — 84145 PROCALCITONIN (PCT): CPT | Performed by: STUDENT IN AN ORGANIZED HEALTH CARE EDUCATION/TRAINING PROGRAM

## 2025-04-10 PROCEDURE — 25510000001 IOPAMIDOL 61 % SOLUTION: Performed by: STUDENT IN AN ORGANIZED HEALTH CARE EDUCATION/TRAINING PROGRAM

## 2025-04-10 PROCEDURE — 72125 CT NECK SPINE W/O DYE: CPT

## 2025-04-10 PROCEDURE — 93005 ELECTROCARDIOGRAM TRACING: CPT | Performed by: STUDENT IN AN ORGANIZED HEALTH CARE EDUCATION/TRAINING PROGRAM

## 2025-04-10 PROCEDURE — 0202U NFCT DS 22 TRGT SARS-COV-2: CPT | Performed by: STUDENT IN AN ORGANIZED HEALTH CARE EDUCATION/TRAINING PROGRAM

## 2025-04-10 PROCEDURE — 99285 EMERGENCY DEPT VISIT HI MDM: CPT | Performed by: STUDENT IN AN ORGANIZED HEALTH CARE EDUCATION/TRAINING PROGRAM

## 2025-04-10 PROCEDURE — 85652 RBC SED RATE AUTOMATED: CPT | Performed by: STUDENT IN AN ORGANIZED HEALTH CARE EDUCATION/TRAINING PROGRAM

## 2025-04-10 PROCEDURE — 73030 X-RAY EXAM OF SHOULDER: CPT

## 2025-04-10 RX ORDER — ORPHENADRINE CITRATE 30 MG/ML
60 INJECTION INTRAMUSCULAR; INTRAVENOUS ONCE
Status: COMPLETED | OUTPATIENT
Start: 2025-04-10 | End: 2025-04-10

## 2025-04-10 RX ORDER — IOPAMIDOL 612 MG/ML
100 INJECTION, SOLUTION INTRAVASCULAR
Status: COMPLETED | OUTPATIENT
Start: 2025-04-10 | End: 2025-04-10

## 2025-04-10 RX ORDER — OXYCODONE HYDROCHLORIDE 5 MG/1
5 TABLET ORAL ONCE
Refills: 0 | Status: COMPLETED | OUTPATIENT
Start: 2025-04-10 | End: 2025-04-10

## 2025-04-10 RX ADMIN — OXYCODONE HYDROCHLORIDE 5 MG: 5 TABLET ORAL at 22:57

## 2025-04-10 RX ADMIN — IOPAMIDOL 100 ML: 612 INJECTION, SOLUTION INTRAVENOUS at 19:31

## 2025-04-10 RX ADMIN — ORPHENADRINE CITRATE 60 MG: 60 INJECTION INTRAMUSCULAR; INTRAVENOUS at 22:58

## 2025-04-10 NOTE — ED PROVIDER NOTES
[facility] EMERGENCY DEPARTMENT  Emergency Department Encounter  Emergency Medicine Physician Note       Pt Name: Geraldine Pat  MRN: [id number]  Pt :   1975  Room Number:    Date of encounter:  4/10/2025  PCP: Obed Mcguire PA  ED Provider: Pablo Camacho MD    Historian: Patient      HPI:  Chief Complaint: Right sided pain        Context: Geraldine Pat is a 49 y.o. female who presents to the ED for right sided pain.  Patient states over the past few days she has had right sided chest, neck, arm pain.  Mostly in the shoulder.  She states no known injuries she has tried applying heat and muscle relaxers.  She states she had chills over the past few days no fevers.  No cough worse than baseline.  She smokes half a pack per day.  Denies alcohol or illicit drugs.  Denies abdominal pain.  She has had a few cases of loose stool over the past several days.      PAST MEDICAL HISTORY  Past Medical History:   Diagnosis Date    Arthritis     Asthma     Cataract     Colon polyp     COPD (chronic obstructive pulmonary disease)     Deep vein thrombosis     right leg    Depression     Diabetes mellitus     Hyperlipidemia     Low back pain     Obesity     PVD (peripheral vascular disease)     Substance abuse     Type 2 diabetes mellitus     Visual impairment          PAST SURGICAL HISTORY  Past Surgical History:   Procedure Laterality Date    ABSCESS DRAINAGE      ANGIOPLASTY / STENTING FEMORAL Right     CARDIAC CATHETERIZATION  2024    no stents needed    CATARACT EXTRACTION W/ INTRAOCULAR LENS IMPLANT Right 10/24/2024    Procedure: CATARACT PHACO EXTRACTION WITH INTRAOCULAR LENS IMPLANT RIGHT;  Surgeon: Suresh Moran MD;  Location: [facility];  Service: Ophthalmology;  Laterality: Right;    CATARACT EXTRACTION W/ INTRAOCULAR LENS IMPLANT Left 2024    Procedure: CATARACT PHACO EXTRACTION WITH INTRAOCULAR LENS IMPLANT LEFT;  Surgeon: Suresh Moran,  MD;  Location: [facility] OR;  Service: Ophthalmology;  Laterality: Left;     SECTION      COLONOSCOPY      SKIN GRAFT      TONSILLECTOMY           FAMILY HISTORY  Family History   Problem Relation Age of Onset    Alcohol abuse Mother     Depression Mother     Diabetes Mother     Drug abuse Mother     Hearing loss Mother     Hyperlipidemia Mother     Learning disabilities Mother     Mental illness Mother     Developmental Disability Father     Diabetes Father     Drug abuse Father     Heart disease Father     Hyperlipidemia Father     Mental illness Father     Obesity Father     Diabetes Sister     Cancer Sister     Drug abuse Sister     Diabetes Sister          SOCIAL HISTORY  Social History     Socioeconomic History    Marital status:    Tobacco Use    Smoking status: Every Day     Current packs/day: 1.00     Average packs/day: 1 pack/day for 40.0 years (40.0 ttl pk-yrs)     Types: Cigarettes    Smokeless tobacco: Never   Vaping Use    Vaping status: Never Used   Substance and Sexual Activity    Alcohol use: Not Currently     Comment: Quit 16    Drug use: Not Currently     Types: Amphetamines, Methamphetamines    Sexual activity: Not Currently     Birth control/protection: Post-menopausal     Comment: Non att         ALLERGIES  Naproxen, Quetiapine, Salsalate, and Vancomycin        REVIEW OF SYSTEMS  As noted in HPI      PHYSICAL EXAM    I have reviewed the triage vital signs and nursing notes.    ED Triage Vitals [04/10/25 1819]   Temp Heart Rate Resp BP SpO2   98.1 °F (36.7 °C) 92 17 153/92 95 %      Temp src Heart Rate Source Patient Position BP Location FiO2 (%)   Oral Monitor Sitting Left arm --       Physical Exam  Constitutional:       General: She is not in acute distress.  HENT:      Head: Atraumatic.   Cardiovascular:      Rate and Rhythm: Normal rate and regular rhythm.      Pulses: Normal pulses.   Pulmonary:      Effort: Pulmonary effort is normal. No respiratory distress.      Breath  sounds: No wheezing or rales.   Abdominal:      Palpations: Abdomen is soft.      Tenderness: There is no abdominal tenderness.   Musculoskeletal:      Cervical back: Neck supple.      Comments: Tenderness to palpation mostly right paracervical spinal musculature as well as right trapezius and right shoulder.  She also has some tenderness over the right anterior chest wall.  No overlying lesions no bony deformities.   Skin:     General: Skin is warm.   Neurological:      Mental Status: She is alert.           LAB RESULTS  No results found for this or any previous visit (from the past 24 hours).      If labs were ordered, I independently reviewed the results and considered them in treating the patient.        RADIOLOGY  No Radiology Exams Resulted Within Past 24 Hours    PROCEDURES    Procedures    ECG 12 Lead Chest Pain   Final Result          MEDICATIONS GIVEN IN ER    Medications   iopamidol (ISOVUE-300) 61 % injection 100 mL (100 mL Intravenous Given 4/10/25 1931)   oxyCODONE (ROXICODONE) immediate release tablet 5 mg (5 mg Oral Given 4/10/25 2257)   orphenadrine (NORFLEX) injection 60 mg (60 mg Intramuscular Given 4/10/25 2258)         MEDICAL DECISION MAKING, PROGRESS, and CONSULTS    All labs, if obtained, have been independently reviewed by me.  All radiology studies, if obtained, have been reviewed by me and the radiologist dictating the report.  All EKG's, if obtained, have been independently viewed and interpreted by me.      Discussion below represents my analysis of pertinent findings related to patient's condition, differential diagnosis, treatment plan and final disposition.                         Differential diagnosis:    Dissection, PE, ACS, musculoskeletal strain, cervical radiculopathy, fracture, dislocation, others.      Additional sources:    - Discussed/ obtained information from independent historians:      - External (non-ED) record review: Primary care office visit 04/09/25    - Chronic or  social conditions impacting care:      - Shared decision making:        Orders placed during this visit:  Orders Placed This Encounter   Procedures    Respiratory Panel PCR w/COVID-19(SARS-CoV-2) SHERLEY/ALEXIS/GONZALES/PAD/COR/BRANDY In-House, NP Swab in UTM/VTM, 2 HR TAT - Swab, Nasopharynx    CT Angiogram Chest    CT Head Without Contrast    CT Cervical Spine Without Contrast    XR Shoulder 2+ View Right    [place] Draw    Comprehensive Metabolic Panel    High Sensitivity Troponin T    Procalcitonin    Sedimentation Rate    C-reactive Protein    CK    Magnesium    TSH Rfx On Abnormal To Free T4    Phosphorus    Lipase    CBC Auto Differential    Lactic Acid, Plasma    High Sensitivity Troponin T 1Hr    ECG 12 Lead Chest Pain    Green Top (Gel)    Lavender Top    Gold Top - SST    CBC & Differential         Additional orders considered but not ordered:      ED Course/MDM Discussion:    Patient is a 49-year-old female who presented for right sided chest wall shoulder and neck pain mostly in the trapezius musculature.    No rash on examination.  No associated neurological deficits.    EKG on arrival demonstrated sinus rhythm with extreme axis normal intervals poor R wave progression no acute ST elevation on my interpretation.    Her labs were generally reassuring she does have leukocytosis and erythrocytosis similar to previous.  No major transaminase elevation.  CK is not markedly elevated.  Low suspicion for infectious etiology her inflammatory markers are negative including normal ESR and CRP normal lactate and normal procalcitonin.  Her electrolytes are reassuring.  History felt atypical for ACS and troponin is normal at 0 and 1 hour and NSTEMI has been excluded.  CTA of the chest was performed to rule out VTE and dissection and these were not found.  CVA not suspected given absence of focal weakness or sensation loss.  Plain films of the right shoulder demonstrated no obvious fracture on my interpretation of imaging.  CT of  the head showed no acute abnormality.  CT cervical spine showed no acute abnormality she does have degenerative change and would consider underlying radicular symptoms.  Consider musculoskeletal etiology, the evidence does not support acute cardiopulmonary etiology or infectious.  She was given analgesia to good effect.  Her vital signs remained stable in the department and given her benign workup she was deemed stable and appropriate for continued outpatient management with strict return precautions.  Patient is agreeable to this plan.                    Consultants:      Shared Decision Making:  After my consideration of clinical presentation and any laboratory/radiology studies obtained, I discussed the findings with the patient/patient representative who is in agreement with the treatment plan and the final disposition.   Risks and benefits of discharge and/or observation/admission were discussed.       AS OF 06:34 EDT VITALS:    BP - 113/70  HR - 72  TEMP - 98.1 °F (36.7 °C) (Oral)  O2 SATS - 97%                  DIAGNOSIS  Final diagnoses:   Right-sided chest wall pain   Acute pain of right shoulder   Neck muscle spasm         DISPOSITION  ED Disposition       ED Disposition   Discharge    Condition   Stable    Comment   --                   Please note that portions of this document were completed with voice recognition software.        Pablo Camacho MD  04/12/25 0686

## 2025-04-11 NOTE — DISCHARGE INSTRUCTIONS
You were evaluated today for your symptoms including pain along the chest wall, right shoulder, and neck.  Imaging was reassuring without evidence of cardiopulmonary abnormality.  You do have evidence of degenerative changes of the neck.  You may continue to take baclofen to help with muscle pain.  We recommend following up closely with your primary care provider to continue to monitor the symptoms.  Do not hesitate to return if symptoms worsen in any way or if you develop other concerning signs or symptoms such as numbness, weakness, fainting episodes, severe chest pain or shortness of breath.

## 2025-04-22 ENCOUNTER — OFFICE VISIT (OUTPATIENT)
Dept: ENDOCRINOLOGY | Facility: CLINIC | Age: 50
End: 2025-04-22
Payer: MEDICAID

## 2025-04-22 VITALS
WEIGHT: 262 LBS | BODY MASS INDEX: 42.11 KG/M2 | DIASTOLIC BLOOD PRESSURE: 70 MMHG | HEIGHT: 66 IN | SYSTOLIC BLOOD PRESSURE: 120 MMHG | HEART RATE: 78 BPM

## 2025-04-22 DIAGNOSIS — E78.5 HYPERLIPIDEMIA, UNSPECIFIED HYPERLIPIDEMIA TYPE: ICD-10-CM

## 2025-04-22 DIAGNOSIS — E11.65 TYPE 2 DIABETES MELLITUS WITH HYPERGLYCEMIA, WITH LONG-TERM CURRENT USE OF INSULIN: Primary | ICD-10-CM

## 2025-04-22 DIAGNOSIS — Z79.4 TYPE 2 DIABETES MELLITUS WITH HYPERGLYCEMIA, WITH LONG-TERM CURRENT USE OF INSULIN: Primary | ICD-10-CM

## 2025-04-22 LAB
EXPIRATION DATE: ABNORMAL
EXPIRATION DATE: NORMAL
GLUCOSE BLDC GLUCOMTR-MCNC: 85 MG/DL (ref 70–130)
HBA1C MFR BLD: 8 % (ref 4.5–5.7)
Lab: ABNORMAL
Lab: NORMAL

## 2025-04-22 PROCEDURE — 95251 CONT GLUC MNTR ANALYSIS I&R: CPT | Performed by: INTERNAL MEDICINE

## 2025-04-22 PROCEDURE — 83036 HEMOGLOBIN GLYCOSYLATED A1C: CPT | Performed by: INTERNAL MEDICINE

## 2025-04-22 PROCEDURE — 3052F HG A1C>EQUAL 8.0%<EQUAL 9.0%: CPT | Performed by: INTERNAL MEDICINE

## 2025-04-22 PROCEDURE — 99214 OFFICE O/P EST MOD 30 MIN: CPT | Performed by: INTERNAL MEDICINE

## 2025-04-22 RX ORDER — SEMAGLUTIDE 2.68 MG/ML
2 INJECTION, SOLUTION SUBCUTANEOUS WEEKLY
Qty: 9 ML | Refills: 1 | Status: SHIPPED | OUTPATIENT
Start: 2025-04-22

## 2025-04-22 NOTE — PROGRESS NOTES
"Chief Complaint   Patient presents with    Diabetes        HPI   Geraldine Pat is a 49 y.o. female had concerns including Diabetes.      Patient presents for follow-up of diabetes.  She was started on Ozempic following last visit and dose has been increased, most recently just over a month ago.  She does report she initially had some nausea with most recent dose increase but has not had any symptoms in the past week.  She has been reducing dose of Lantus as Ozempic dose was increased.    Current diabetes medications include the following:  Ozempic 1 mg weekly  Lantus 30 units twice daily  Jardiance 25 mg daily  Apidra 15-20 units twice    Patient is monitoring glucose using freestyle jovon.  She has lost approximately 10 pounds since last visit.  She is interested in further dose increase of Ozempic.    Patient is taking atorvastatin 80 mg daily for hyperlipidemia.    The following portions of the patient's history were reviewed and updated as appropriate: allergies, current medications, and past social history.    Review of Systems   Gastrointestinal:  Negative for nausea and vomiting.       /70   Pulse 78   Ht 168 cm (66.14\")   Wt 119 kg (262 lb)   BMI 42.11 kg/m²      Physical Exam  Cardiovascular:      Pulses:           Dorsalis pedis pulses are 2+ on the right side and 2+ on the left side.   Feet:      Right foot:      Protective Sensation: 5 sites tested.  4 sites sensed.      Skin integrity: Skin integrity normal.      Left foot:      Protective Sensation: 5 sites tested.  5 sites sensed.      Skin integrity: Skin integrity normal.         Constitutional:  well developed; well nourished  no acute distress  appears stated age  obese - Body mass index is 42.11 kg/m².   ENT/Thyroid: not examined   Eyes: Conjunctiva: clear   Respiratory:  breathing is unlabored  clear to auscultation bilaterally   Cardiovascular:  regular rate and rhythm   Chest:  Not performed.   Abdomen: soft, non-tender; no masses "   : Not performed.   Musculoskeletal: Not performed   Skin: not performed.   Neuro: mental status, speech normal   Psych: mood and affect are within normal limits     Diabetic Foot Exam Performed and Monofilament Test Performed   Labs/Imaging  A1C:  Lab Results   Component Value Date    HGBA1C 8.0 (A) 04/22/2025    HGBA1C 9.4 (A) 01/22/2025      Glucose:  Lab Results   Component Value Date    POCGLU 85 04/22/2025      CMP:  Lab Results   Component Value Date    GLUCOSE 129 (H) 04/10/2025    BUN 12 04/10/2025    CREATININE 0.91 04/10/2025     04/10/2025    K 4.1 04/10/2025     04/10/2025    CALCIUM 9.4 04/10/2025    PROTEINTOT 7.0 04/10/2025    ALBUMIN 4.0 04/10/2025    ALT 18 04/10/2025    AST 19 04/10/2025    ALKPHOS 99 04/10/2025    BILITOT 0.7 04/10/2025    GLOB 3.0 04/10/2025    AGRATIO 1.3 04/10/2025    BCR 13.2 04/10/2025    ANIONGAP 12.2 04/10/2025    EGFR 76.5 04/10/2025      Lipid Panel:  Lab Results   Component Value Date    CHOL 175 08/21/2024    TRIG 158 (H) 08/21/2024    HDL 44 08/21/2024     (H) 08/21/2024      Urine Microalbumin:  Lab Results   Component Value Date    MICROALBUR <1.2 01/22/2025      TSH:  Lab Results   Component Value Date    TSH 1.150 04/10/2025       Freestyle jovon downloaded for review for dates ranging April 9 through April 22, 2025, average glucose is 141 with GMI of 6.7%.  95% of days have CGM data.  80% of data is within target range, 1% low, 17% high, 2% very high.  Patient has pattern of postprandial hyperglycemia.  There is no pattern of hypoglycemia.    Diagnoses and all orders for this visit:    1. Type 2 diabetes mellitus with hyperglycemia, with long-term current use of insulin (Primary)  -     POC Glucose, Blood  -     POC Glycosylated Hemoglobin (Hb A1C)  Hemoglobin A1c is above target but improved from prior, now 8%.  Discussed that this is not fully reflective of changes to medications made in the interim since last visit as it is measuring 3  months prior to current date.  Recent GMI is at target.  CGM continued 72 hours of glucose data was downloaded and reviewed  Discussed options for changes to therapy.  Patient would like to further increase dose of Ozempic.  Prescription was sent for 2 mg weekly.  Patient to reduce Apidra to 15 units with meals plus correction  Patient will reduce Lantus to 24 units twice daily  Patient will continue Jardiance 25 mg daily  Discussed goals for glycemic control.  Patient to contact the clinic in the interim between visits if values are not at target.  Screening labs are up-to-date  Monofilament exam completed today      2. Hyperlipidemia, unspecified hyperlipidemia type  Lipid panel is up-to-date from August 2024, patient will continue atorvastatin.    Other orders  -     Semaglutide, 2 MG/DOSE, (Ozempic, 2 MG/DOSE,) 8 MG/3ML solution pen-injector; Inject 2 mg under the skin into the appropriate area as directed 1 (One) Time Per Week.  Dispense: 9 mL; Refill: 1         Return in about 4 months (around 8/22/2025). The patient was instructed to contact the clinic with any interval questions or concerns.    Electronically signed by: Oxana Rodríguez MD   Endocrinologist    Dictated Utilizing Dragon Dictation

## 2025-05-19 DIAGNOSIS — G89.29 CHRONIC LOW BACK PAIN WITH SCIATICA, SCIATICA LATERALITY UNSPECIFIED, UNSPECIFIED BACK PAIN LATERALITY: ICD-10-CM

## 2025-05-19 DIAGNOSIS — M54.40 CHRONIC LOW BACK PAIN WITH SCIATICA, SCIATICA LATERALITY UNSPECIFIED, UNSPECIFIED BACK PAIN LATERALITY: ICD-10-CM

## 2025-05-20 RX ORDER — GLUCOSAMINE HCL/CHONDROITIN SU 500-400 MG
CAPSULE ORAL
Qty: 100 EACH | Refills: 3 | Status: SHIPPED | OUTPATIENT
Start: 2025-05-20

## 2025-05-20 RX ORDER — LANCETS 33 GAUGE
1 EACH MISCELLANEOUS DAILY
Qty: 100 EACH | Refills: 3 | Status: SHIPPED | OUTPATIENT
Start: 2025-05-20

## 2025-05-23 RX ORDER — GABAPENTIN 300 MG/1
300 CAPSULE ORAL EVERY 12 HOURS
Qty: 60 CAPSULE | Refills: 3 | Status: SHIPPED | OUTPATIENT
Start: 2025-05-23

## 2025-05-27 DIAGNOSIS — G89.29 CHRONIC LOW BACK PAIN WITH SCIATICA, SCIATICA LATERALITY UNSPECIFIED, UNSPECIFIED BACK PAIN LATERALITY: ICD-10-CM

## 2025-05-27 DIAGNOSIS — M54.40 CHRONIC LOW BACK PAIN WITH SCIATICA, SCIATICA LATERALITY UNSPECIFIED, UNSPECIFIED BACK PAIN LATERALITY: ICD-10-CM

## 2025-05-27 RX ORDER — BACLOFEN 10 MG/1
10 TABLET ORAL 3 TIMES DAILY
Qty: 60 TABLET | Refills: 3 | Status: SHIPPED | OUTPATIENT
Start: 2025-05-27

## 2025-06-09 DIAGNOSIS — I73.9 PERIPHERAL VASCULAR DISEASE: Primary | ICD-10-CM

## 2025-06-11 ENCOUNTER — TELEPHONE (OUTPATIENT)
Dept: FAMILY MEDICINE CLINIC | Facility: CLINIC | Age: 50
End: 2025-06-11

## 2025-06-11 DIAGNOSIS — G47.30 SLEEP APNEA, UNSPECIFIED TYPE: Primary | ICD-10-CM

## 2025-06-11 NOTE — TELEPHONE ENCOUNTER
Caller: [facility]     Relationship: [unfilled]     Best call back number: [phone]    What is your medical concern? CALLED TO VERIFY IF PLAN OF CARE HAS BEEN RECEIVED AND SIGNED

## 2025-06-23 RX ORDER — INSULIN GLULISINE 100 [IU]/ML
20 INJECTION, SOLUTION SUBCUTANEOUS
Qty: 60 ML | Refills: 1 | Status: SHIPPED | OUTPATIENT
Start: 2025-06-23

## 2025-06-23 RX ORDER — SEMAGLUTIDE 0.68 MG/ML
0.25 INJECTION, SOLUTION SUBCUTANEOUS WEEKLY
Qty: 3 ML | Refills: 1 | Status: SHIPPED | OUTPATIENT
Start: 2025-06-23

## 2025-06-23 NOTE — TELEPHONE ENCOUNTER
Rx Refill Note  Requested Prescriptions     Pending Prescriptions Disp Refills    Semaglutide,0.25 or 0.5MG/DOS, (Ozempic, 0.25 or 0.5 MG/DOSE,) 2 MG/3ML solution pen-injector 3 mL 0     Sig: Inject 0.25 mg under the skin into the appropriate area as directed 1 (One) Time Per Week.    insulin glulisine (Apidra) 100 UNIT/ML injection 60 mL 0     Sig: Inject 20 Units under the skin into the appropriate area as directed 3 (Three) Times a Day Before Meals.      Last office visit with prescribing clinician: 4/22/2025     Next office visit with prescribing clinician: 8/26/2025

## 2025-07-09 ENCOUNTER — TELEPHONE (OUTPATIENT)
Dept: FAMILY MEDICINE CLINIC | Facility: CLINIC | Age: 50
End: 2025-07-09

## 2025-07-15 ENCOUNTER — PRIOR AUTHORIZATION (OUTPATIENT)
Dept: ENDOCRINOLOGY | Facility: CLINIC | Age: 50
End: 2025-07-15
Payer: MEDICAID

## 2025-07-15 NOTE — TELEPHONE ENCOUNTER
JYOTI SNYDER (Key: [id number])  Ozempic (0.25 or 0.5 MG/DOSE) 2MG/3ML pen-injectors  Form  [employer] [place] Medicaid ePA Form 2017 NCPDP  Created  13 minutes ago  Sent to Plan  1 minute ago  Plan Response  less than a minute ago  Submit Clinical Questions  Determination  Message from Plan  Member has an active PA on file which is expiring on 07/27/2025 and has 995 no. of fills remaining.

## 2025-08-15 RX ORDER — FLUCONAZOLE 150 MG/1
150 TABLET ORAL ONCE
Qty: 1 TABLET | Refills: 0 | Status: SHIPPED | OUTPATIENT
Start: 2025-08-15 | End: 2025-08-15

## 2025-08-20 ENCOUNTER — OFFICE VISIT (OUTPATIENT)
Dept: FAMILY MEDICINE CLINIC | Facility: CLINIC | Age: 50
End: 2025-08-20
Payer: MEDICAID

## 2025-08-20 VITALS
DIASTOLIC BLOOD PRESSURE: 72 MMHG | HEIGHT: 67 IN | WEIGHT: 257.2 LBS | HEART RATE: 84 BPM | SYSTOLIC BLOOD PRESSURE: 118 MMHG | OXYGEN SATURATION: 97 % | BODY MASS INDEX: 40.37 KG/M2 | RESPIRATION RATE: 16 BRPM

## 2025-08-20 DIAGNOSIS — E11.65 TYPE 2 DIABETES MELLITUS WITH HYPERGLYCEMIA, WITH LONG-TERM CURRENT USE OF INSULIN: Primary | ICD-10-CM

## 2025-08-20 DIAGNOSIS — Z79.4 TYPE 2 DIABETES MELLITUS WITH HYPERGLYCEMIA, WITH LONG-TERM CURRENT USE OF INSULIN: Primary | ICD-10-CM

## 2025-08-20 DIAGNOSIS — M25.551 PAIN OF RIGHT HIP: ICD-10-CM

## 2025-08-20 DIAGNOSIS — M25.561 ACUTE PAIN OF RIGHT KNEE: ICD-10-CM

## 2025-08-20 DIAGNOSIS — I73.9 PERIPHERAL VASCULAR DISEASE: ICD-10-CM

## 2025-08-20 LAB
EXPIRATION DATE: ABNORMAL
HBA1C MFR BLD: 9.7 % (ref 4.5–5.7)
Lab: ABNORMAL

## 2025-08-26 ENCOUNTER — OFFICE VISIT (OUTPATIENT)
Dept: ENDOCRINOLOGY | Facility: CLINIC | Age: 50
End: 2025-08-26
Payer: MEDICAID

## 2025-08-26 VITALS
SYSTOLIC BLOOD PRESSURE: 120 MMHG | HEIGHT: 67 IN | BODY MASS INDEX: 40.18 KG/M2 | DIASTOLIC BLOOD PRESSURE: 70 MMHG | WEIGHT: 256 LBS | HEART RATE: 88 BPM

## 2025-08-26 DIAGNOSIS — E11.65 TYPE 2 DIABETES MELLITUS WITH HYPERGLYCEMIA, WITH LONG-TERM CURRENT USE OF INSULIN: Primary | ICD-10-CM

## 2025-08-26 DIAGNOSIS — F41.9 ANXIETY: Primary | ICD-10-CM

## 2025-08-26 DIAGNOSIS — E78.5 HYPERLIPIDEMIA, UNSPECIFIED HYPERLIPIDEMIA TYPE: ICD-10-CM

## 2025-08-26 DIAGNOSIS — Z79.4 TYPE 2 DIABETES MELLITUS WITH HYPERGLYCEMIA, WITH LONG-TERM CURRENT USE OF INSULIN: Primary | ICD-10-CM

## 2025-08-26 DIAGNOSIS — N91.2 AMENORRHEA: ICD-10-CM

## 2025-08-26 LAB
EXPIRATION DATE: ABNORMAL
GLUCOSE BLDC GLUCOMTR-MCNC: 143 MG/DL (ref 70–130)
Lab: ABNORMAL

## 2025-08-26 PROCEDURE — 82670 ASSAY OF TOTAL ESTRADIOL: CPT | Performed by: INTERNAL MEDICINE

## 2025-08-26 PROCEDURE — 80061 LIPID PANEL: CPT | Performed by: INTERNAL MEDICINE

## 2025-08-26 PROCEDURE — 80053 COMPREHEN METABOLIC PANEL: CPT | Performed by: INTERNAL MEDICINE

## 2025-08-26 PROCEDURE — 83002 ASSAY OF GONADOTROPIN (LH): CPT | Performed by: INTERNAL MEDICINE

## 2025-08-26 PROCEDURE — 83001 ASSAY OF GONADOTROPIN (FSH): CPT | Performed by: INTERNAL MEDICINE

## 2025-08-26 RX ORDER — SEMAGLUTIDE 1.34 MG/ML
1 INJECTION, SOLUTION SUBCUTANEOUS WEEKLY
Qty: 3 ML | Refills: 4 | Status: SHIPPED | OUTPATIENT
Start: 2025-08-26

## 2025-08-26 RX ORDER — INSULIN GLULISINE 100 [IU]/ML
INJECTION, SOLUTION SUBCUTANEOUS
Qty: 75 ML | Refills: 1 | Status: SHIPPED | OUTPATIENT
Start: 2025-08-26

## 2025-08-26 RX ORDER — INSULIN GLARGINE 100 [IU]/ML
40 INJECTION, SOLUTION SUBCUTANEOUS 2 TIMES DAILY
Qty: 90 ML | Refills: 1 | Status: SHIPPED | OUTPATIENT
Start: 2025-08-26

## 2025-08-27 LAB
ALBUMIN SERPL-MCNC: 4.3 G/DL (ref 3.5–5.2)
ALBUMIN/GLOB SERPL: 1.3 G/DL
ALP SERPL-CCNC: 105 U/L (ref 39–117)
ALT SERPL W P-5'-P-CCNC: 19 U/L (ref 1–33)
ANION GAP SERPL CALCULATED.3IONS-SCNC: 19.8 MMOL/L (ref 5–15)
AST SERPL-CCNC: 19 U/L (ref 1–32)
BILIRUB SERPL-MCNC: 0.8 MG/DL (ref 0–1.2)
BUN SERPL-MCNC: 13 MG/DL (ref 6–20)
BUN/CREAT SERPL: 17.1 (ref 7–25)
CALCIUM SPEC-SCNC: 10.2 MG/DL (ref 8.6–10.5)
CHLORIDE SERPL-SCNC: 99 MMOL/L (ref 98–107)
CHOLEST SERPL-MCNC: 194 MG/DL (ref 0–200)
CO2 SERPL-SCNC: 20.2 MMOL/L (ref 22–29)
CREAT SERPL-MCNC: 0.76 MG/DL (ref 0.57–1)
EGFRCR SERPLBLD CKD-EPI 2021: 94.4 ML/MIN/1.73
ESTRADIOL SERPL HS-MCNC: 11.4 PG/ML
FSH SERPL-ACNC: 55.8 MIU/ML
GLOBULIN UR ELPH-MCNC: 3.2 GM/DL
GLUCOSE SERPL-MCNC: 100 MG/DL (ref 65–99)
HDLC SERPL-MCNC: 45 MG/DL (ref 40–60)
LDLC SERPL CALC-MCNC: 112 MG/DL (ref 0–100)
LDLC/HDLC SERPL: 2.37 {RATIO}
LH SERPL-ACNC: 28.4 MIU/ML
POTASSIUM SERPL-SCNC: 4.1 MMOL/L (ref 3.5–5.2)
PROT SERPL-MCNC: 7.5 G/DL (ref 6–8.5)
SODIUM SERPL-SCNC: 139 MMOL/L (ref 136–145)
TRIGL SERPL-MCNC: 211 MG/DL (ref 0–150)
VLDLC SERPL-MCNC: 37 MG/DL (ref 5–40)

## (undated) DEVICE — CLEARCUT® HP2 SLIT KNIFE INTREPID MICRO-COAXIAL SYSTEM 2.4 DB: Brand: CLEARCUT®; INTREPID

## (undated) DEVICE — CANN IRR/INJ AIR ANT CHAMBER 6MM BEND 27G

## (undated) DEVICE — SYR LUERLOK 5CC

## (undated) DEVICE — HP CONCL INTREPID COAX I/A CRV .3MM

## (undated) DEVICE — Device

## (undated) DEVICE — BLD BEAVER MICROSHARP 15D 3MM BLU LF

## (undated) DEVICE — SOL IRRIG H2O 1000ML STRL

## (undated) DEVICE — THE MONARCH® "D" CARTRIDGE IS A SINGLE-USE POLYPROPYLENE CARTRIDGE FOR POSTERIOR CHAMBER IOL DELIVERY: Brand: MONARCH® III

## (undated) DEVICE — GLV SURG SENSICARE PI LF PF 8 GRN STRL

## (undated) DEVICE — GLV SURG SENSICARE W/ALOE PF LF 8 STRL